# Patient Record
Sex: MALE | HISPANIC OR LATINO | URBAN - METROPOLITAN AREA
[De-identification: names, ages, dates, MRNs, and addresses within clinical notes are randomized per-mention and may not be internally consistent; named-entity substitution may affect disease eponyms.]

---

## 2021-08-24 ENCOUNTER — INPATIENT (INPATIENT)
Facility: HOSPITAL | Age: 41
LOS: 0 days | Discharge: ANOTHER HOSPITAL | End: 2021-08-25
Attending: STUDENT IN AN ORGANIZED HEALTH CARE EDUCATION/TRAINING PROGRAM | Admitting: STUDENT IN AN ORGANIZED HEALTH CARE EDUCATION/TRAINING PROGRAM
Payer: MEDICAID

## 2021-08-24 VITALS
DIASTOLIC BLOOD PRESSURE: 87 MMHG | RESPIRATION RATE: 16 BRPM | TEMPERATURE: 98 F | HEART RATE: 79 BPM | SYSTOLIC BLOOD PRESSURE: 133 MMHG | OXYGEN SATURATION: 100 %

## 2021-08-24 NOTE — ED ADULT TRIAGE NOTE - CHIEF COMPLAINT QUOTE
c/o headache and neck pain x 40 minutes. associated with nausea. worse with movement. states "this always happens because I have herniated discs." denies dizziness, weakness or other complaints.

## 2021-08-25 ENCOUNTER — INPATIENT (INPATIENT)
Facility: HOSPITAL | Age: 41
LOS: 6 days | Discharge: HOME | DRG: 93 | End: 2021-09-01
Attending: PSYCHIATRY & NEUROLOGY | Admitting: PSYCHIATRY & NEUROLOGY
Payer: MEDICAID

## 2021-08-25 ENCOUNTER — TRANSCRIPTION ENCOUNTER (OUTPATIENT)
Age: 41
End: 2021-08-25

## 2021-08-25 VITALS
HEART RATE: 73 BPM | TEMPERATURE: 98 F | SYSTOLIC BLOOD PRESSURE: 124 MMHG | OXYGEN SATURATION: 99 % | DIASTOLIC BLOOD PRESSURE: 89 MMHG | RESPIRATION RATE: 18 BRPM | WEIGHT: 149.47 LBS | HEIGHT: 67 IN

## 2021-08-25 VITALS
RESPIRATION RATE: 17 BRPM | SYSTOLIC BLOOD PRESSURE: 139 MMHG | TEMPERATURE: 98 F | HEART RATE: 82 BPM | DIASTOLIC BLOOD PRESSURE: 67 MMHG | OXYGEN SATURATION: 100 %

## 2021-08-25 DIAGNOSIS — Z98.890 OTHER SPECIFIED POSTPROCEDURAL STATES: Chronic | ICD-10-CM

## 2021-08-25 DIAGNOSIS — I63.9 CEREBRAL INFARCTION, UNSPECIFIED: ICD-10-CM

## 2021-08-25 DIAGNOSIS — G08 INTRACRANIAL AND INTRASPINAL PHLEBITIS AND THROMBOPHLEBITIS: ICD-10-CM

## 2021-08-25 DIAGNOSIS — Z29.9 ENCOUNTER FOR PROPHYLACTIC MEASURES, UNSPECIFIED: ICD-10-CM

## 2021-08-25 LAB
ALBUMIN SERPL ELPH-MCNC: 4.6 G/DL — SIGNIFICANT CHANGE UP (ref 3.3–5)
ALP SERPL-CCNC: 72 U/L — SIGNIFICANT CHANGE UP (ref 40–120)
ALT FLD-CCNC: 19 U/L — SIGNIFICANT CHANGE UP (ref 4–41)
ANION GAP SERPL CALC-SCNC: 15 MMOL/L — SIGNIFICANT CHANGE UP (ref 5–17)
ANION GAP SERPL CALC-SCNC: 16 MMOL/L — HIGH (ref 7–14)
APTT BLD: 124.6 SEC — CRITICAL HIGH (ref 27.5–35.5)
APTT BLD: 30.5 SEC — SIGNIFICANT CHANGE UP (ref 27–36.3)
APTT BLD: >200 SEC — CRITICAL HIGH (ref 27–36.3)
AST SERPL-CCNC: 21 U/L — SIGNIFICANT CHANGE UP (ref 4–40)
BASOPHILS # BLD AUTO: 0.11 K/UL — SIGNIFICANT CHANGE UP (ref 0–0.2)
BASOPHILS NFR BLD AUTO: 1.1 % — SIGNIFICANT CHANGE UP (ref 0–2)
BILIRUB SERPL-MCNC: 0.3 MG/DL — SIGNIFICANT CHANGE UP (ref 0.2–1.2)
BUN SERPL-MCNC: 13 MG/DL — SIGNIFICANT CHANGE UP (ref 7–23)
BUN SERPL-MCNC: 7 MG/DL — SIGNIFICANT CHANGE UP (ref 7–23)
CALCIUM SERPL-MCNC: 9.7 MG/DL — SIGNIFICANT CHANGE UP (ref 8.4–10.5)
CALCIUM SERPL-MCNC: 9.9 MG/DL — SIGNIFICANT CHANGE UP (ref 8.4–10.5)
CHLORIDE SERPL-SCNC: 101 MMOL/L — SIGNIFICANT CHANGE UP (ref 98–107)
CHLORIDE SERPL-SCNC: 103 MMOL/L — SIGNIFICANT CHANGE UP (ref 96–108)
CO2 SERPL-SCNC: 21 MMOL/L — LOW (ref 22–31)
CO2 SERPL-SCNC: 23 MMOL/L — SIGNIFICANT CHANGE UP (ref 22–31)
CREAT SERPL-MCNC: 0.67 MG/DL — SIGNIFICANT CHANGE UP (ref 0.5–1.3)
CREAT SERPL-MCNC: 0.95 MG/DL — SIGNIFICANT CHANGE UP (ref 0.5–1.3)
EOSINOPHIL # BLD AUTO: 0.06 K/UL — SIGNIFICANT CHANGE UP (ref 0–0.5)
EOSINOPHIL NFR BLD AUTO: 0.6 % — SIGNIFICANT CHANGE UP (ref 0–6)
GLUCOSE SERPL-MCNC: 129 MG/DL — HIGH (ref 70–99)
GLUCOSE SERPL-MCNC: 97 MG/DL — SIGNIFICANT CHANGE UP (ref 70–99)
HCT VFR BLD CALC: 42.6 % — SIGNIFICANT CHANGE UP (ref 39–50)
HCT VFR BLD CALC: 42.7 % — SIGNIFICANT CHANGE UP (ref 39–50)
HCT VFR BLD CALC: 44.5 % — SIGNIFICANT CHANGE UP (ref 39–50)
HGB BLD-MCNC: 13.7 G/DL — SIGNIFICANT CHANGE UP (ref 13–17)
HGB BLD-MCNC: 14 G/DL — SIGNIFICANT CHANGE UP (ref 13–17)
HGB BLD-MCNC: 14.3 G/DL — SIGNIFICANT CHANGE UP (ref 13–17)
IANC: 7.19 K/UL — SIGNIFICANT CHANGE UP (ref 1.5–8.5)
IMM GRANULOCYTES NFR BLD AUTO: 0.2 % — SIGNIFICANT CHANGE UP (ref 0–1.5)
INR BLD: 1.12 RATIO — SIGNIFICANT CHANGE UP (ref 0.88–1.16)
LYMPHOCYTES # BLD AUTO: 1.94 K/UL — SIGNIFICANT CHANGE UP (ref 1–3.3)
LYMPHOCYTES # BLD AUTO: 19.7 % — SIGNIFICANT CHANGE UP (ref 13–44)
MCHC RBC-ENTMCNC: 28.9 PG — SIGNIFICANT CHANGE UP (ref 27–34)
MCHC RBC-ENTMCNC: 32.1 GM/DL — SIGNIFICANT CHANGE UP (ref 32–36)
MCHC RBC-ENTMCNC: 32.2 GM/DL — SIGNIFICANT CHANGE UP (ref 32–36)
MCHC RBC-ENTMCNC: 32.8 GM/DL — SIGNIFICANT CHANGE UP (ref 32–36)
MCV RBC AUTO: 88 FL — SIGNIFICANT CHANGE UP (ref 80–100)
MCV RBC AUTO: 89.9 FL — SIGNIFICANT CHANGE UP (ref 80–100)
MCV RBC AUTO: 89.9 FL — SIGNIFICANT CHANGE UP (ref 80–100)
MONOCYTES # BLD AUTO: 0.52 K/UL — SIGNIFICANT CHANGE UP (ref 0–0.9)
MONOCYTES NFR BLD AUTO: 5.3 % — SIGNIFICANT CHANGE UP (ref 2–14)
NEUTROPHILS # BLD AUTO: 7.19 K/UL — SIGNIFICANT CHANGE UP (ref 1.8–7.4)
NEUTROPHILS NFR BLD AUTO: 73.1 % — SIGNIFICANT CHANGE UP (ref 43–77)
NRBC # BLD: 0 /100 WBCS — SIGNIFICANT CHANGE UP
NRBC # BLD: 0 /100 WBCS — SIGNIFICANT CHANGE UP
NRBC # BLD: 0 /100 WBCS — SIGNIFICANT CHANGE UP (ref 0–0)
NRBC # FLD: 0 K/UL — SIGNIFICANT CHANGE UP
NRBC # FLD: 0 K/UL — SIGNIFICANT CHANGE UP
PLATELET # BLD AUTO: 239 K/UL — SIGNIFICANT CHANGE UP (ref 150–400)
PLATELET # BLD AUTO: 241 K/UL — SIGNIFICANT CHANGE UP (ref 150–400)
PLATELET # BLD AUTO: 268 K/UL — SIGNIFICANT CHANGE UP (ref 150–400)
POTASSIUM SERPL-MCNC: 3.8 MMOL/L — SIGNIFICANT CHANGE UP (ref 3.5–5.3)
POTASSIUM SERPL-MCNC: 4.7 MMOL/L — SIGNIFICANT CHANGE UP (ref 3.5–5.3)
POTASSIUM SERPL-SCNC: 3.8 MMOL/L — SIGNIFICANT CHANGE UP (ref 3.5–5.3)
POTASSIUM SERPL-SCNC: 4.7 MMOL/L — SIGNIFICANT CHANGE UP (ref 3.5–5.3)
PROT SERPL-MCNC: 7.5 G/DL — SIGNIFICANT CHANGE UP (ref 6–8.3)
PROTHROM AB SERPL-ACNC: 12.8 SEC — SIGNIFICANT CHANGE UP (ref 10.6–13.6)
RBC # BLD: 4.74 M/UL — SIGNIFICANT CHANGE UP (ref 4.2–5.8)
RBC # BLD: 4.85 M/UL — SIGNIFICANT CHANGE UP (ref 4.2–5.8)
RBC # BLD: 4.95 M/UL — SIGNIFICANT CHANGE UP (ref 4.2–5.8)
RBC # FLD: 11.9 % — SIGNIFICANT CHANGE UP (ref 10.3–14.5)
RBC # FLD: 12 % — SIGNIFICANT CHANGE UP (ref 10.3–14.5)
RBC # FLD: 12.1 % — SIGNIFICANT CHANGE UP (ref 10.3–14.5)
SARS-COV-2 RNA SPEC QL NAA+PROBE: SIGNIFICANT CHANGE UP
SODIUM SERPL-SCNC: 139 MMOL/L — SIGNIFICANT CHANGE UP (ref 135–145)
SODIUM SERPL-SCNC: 140 MMOL/L — SIGNIFICANT CHANGE UP (ref 135–145)
WBC # BLD: 6.71 K/UL — SIGNIFICANT CHANGE UP (ref 3.8–10.5)
WBC # BLD: 7.44 K/UL — SIGNIFICANT CHANGE UP (ref 3.8–10.5)
WBC # BLD: 9.84 K/UL — SIGNIFICANT CHANGE UP (ref 3.8–10.5)
WBC # FLD AUTO: 6.71 K/UL — SIGNIFICANT CHANGE UP (ref 3.8–10.5)
WBC # FLD AUTO: 7.44 K/UL — SIGNIFICANT CHANGE UP (ref 3.8–10.5)
WBC # FLD AUTO: 9.84 K/UL — SIGNIFICANT CHANGE UP (ref 3.8–10.5)

## 2021-08-25 PROCEDURE — 99221 1ST HOSP IP/OBS SF/LOW 40: CPT

## 2021-08-25 PROCEDURE — 99223 1ST HOSP IP/OBS HIGH 75: CPT

## 2021-08-25 PROCEDURE — 93306 TTE W/DOPPLER COMPLETE: CPT | Mod: 26

## 2021-08-25 PROCEDURE — 70498 CT ANGIOGRAPHY NECK: CPT | Mod: 26

## 2021-08-25 PROCEDURE — 70496 CT ANGIOGRAPHY HEAD: CPT | Mod: 26

## 2021-08-25 RX ORDER — ONDANSETRON 8 MG/1
4 TABLET, FILM COATED ORAL EVERY 8 HOURS
Refills: 0 | Status: DISCONTINUED | OUTPATIENT
Start: 2021-08-25 | End: 2021-08-25

## 2021-08-25 RX ORDER — LANOLIN ALCOHOL/MO/W.PET/CERES
3 CREAM (GRAM) TOPICAL AT BEDTIME
Refills: 0 | Status: DISCONTINUED | OUTPATIENT
Start: 2021-08-25 | End: 2021-08-25

## 2021-08-25 RX ORDER — HEPARIN SODIUM 5000 [USP'U]/ML
5500 INJECTION INTRAVENOUS; SUBCUTANEOUS ONCE
Refills: 0 | Status: COMPLETED | OUTPATIENT
Start: 2021-08-25 | End: 2021-08-25

## 2021-08-25 RX ORDER — ACETAMINOPHEN 500 MG
1000 TABLET ORAL ONCE
Refills: 0 | Status: COMPLETED | OUTPATIENT
Start: 2021-08-25 | End: 2021-08-25

## 2021-08-25 RX ORDER — HEPARIN SODIUM 5000 [USP'U]/ML
2500 INJECTION INTRAVENOUS; SUBCUTANEOUS EVERY 6 HOURS
Refills: 0 | Status: DISCONTINUED | OUTPATIENT
Start: 2021-08-25 | End: 2021-08-25

## 2021-08-25 RX ORDER — HEPARIN SODIUM 5000 [USP'U]/ML
1000 INJECTION INTRAVENOUS; SUBCUTANEOUS
Qty: 25000 | Refills: 0 | Status: DISCONTINUED | OUTPATIENT
Start: 2021-08-25 | End: 2021-08-26

## 2021-08-25 RX ORDER — HEPARIN SODIUM 5000 [USP'U]/ML
12 INJECTION INTRAVENOUS; SUBCUTANEOUS
Qty: 25000 | Refills: 0 | Status: DISCONTINUED | OUTPATIENT
Start: 2021-08-25 | End: 2021-08-25

## 2021-08-25 RX ORDER — SODIUM CHLORIDE 9 MG/ML
1000 INJECTION INTRAMUSCULAR; INTRAVENOUS; SUBCUTANEOUS
Refills: 0 | Status: DISCONTINUED | OUTPATIENT
Start: 2021-08-25 | End: 2021-09-01

## 2021-08-25 RX ORDER — ACETAMINOPHEN 500 MG
2 TABLET ORAL
Qty: 0 | Refills: 0 | DISCHARGE
Start: 2021-08-25

## 2021-08-25 RX ORDER — HEPARIN SODIUM 5000 [USP'U]/ML
INJECTION INTRAVENOUS; SUBCUTANEOUS
Qty: 25000 | Refills: 0 | Status: DISCONTINUED | OUTPATIENT
Start: 2021-08-25 | End: 2021-08-25

## 2021-08-25 RX ORDER — METOCLOPRAMIDE HCL 10 MG
10 TABLET ORAL ONCE
Refills: 0 | Status: COMPLETED | OUTPATIENT
Start: 2021-08-25 | End: 2021-08-25

## 2021-08-25 RX ORDER — ACETAMINOPHEN 500 MG
650 TABLET ORAL EVERY 6 HOURS
Refills: 0 | Status: DISCONTINUED | OUTPATIENT
Start: 2021-08-25 | End: 2021-08-25

## 2021-08-25 RX ORDER — HEPARIN SODIUM 5000 [USP'U]/ML
10 INJECTION INTRAVENOUS; SUBCUTANEOUS
Qty: 25000 | Refills: 0 | Status: DISCONTINUED | OUTPATIENT
Start: 2021-08-25 | End: 2021-08-25

## 2021-08-25 RX ORDER — HEPARIN SODIUM 5000 [USP'U]/ML
5500 INJECTION INTRAVENOUS; SUBCUTANEOUS EVERY 6 HOURS
Refills: 0 | Status: DISCONTINUED | OUTPATIENT
Start: 2021-08-25 | End: 2021-08-25

## 2021-08-25 RX ORDER — HEPARIN SODIUM 5000 [USP'U]/ML
0 INJECTION INTRAVENOUS; SUBCUTANEOUS
Qty: 0 | Refills: 0 | DISCHARGE
Start: 2021-08-25

## 2021-08-25 RX ADMIN — HEPARIN SODIUM 1200 UNIT(S)/HR: 5000 INJECTION INTRAVENOUS; SUBCUTANEOUS at 05:41

## 2021-08-25 RX ADMIN — HEPARIN SODIUM 0 UNIT(S)/HR: 5000 INJECTION INTRAVENOUS; SUBCUTANEOUS at 14:17

## 2021-08-25 RX ADMIN — Medication 400 MILLIGRAM(S): at 23:00

## 2021-08-25 RX ADMIN — SODIUM CHLORIDE 100 MILLILITER(S): 9 INJECTION INTRAMUSCULAR; INTRAVENOUS; SUBCUTANEOUS at 20:01

## 2021-08-25 RX ADMIN — Medication 400 MILLIGRAM(S): at 02:11

## 2021-08-25 RX ADMIN — Medication 650 MILLIGRAM(S): at 09:41

## 2021-08-25 RX ADMIN — HEPARIN SODIUM 9 UNIT(S)/HR: 5000 INJECTION INTRAVENOUS; SUBCUTANEOUS at 20:01

## 2021-08-25 RX ADMIN — Medication 650 MILLIGRAM(S): at 16:03

## 2021-08-25 RX ADMIN — Medication 10 MILLIGRAM(S): at 02:12

## 2021-08-25 RX ADMIN — Medication 400 MILLIGRAM(S): at 18:21

## 2021-08-25 RX ADMIN — Medication 1000 MILLIGRAM(S): at 18:50

## 2021-08-25 RX ADMIN — HEPARIN SODIUM 10 UNIT(S)/HR: 5000 INJECTION INTRAVENOUS; SUBCUTANEOUS at 18:00

## 2021-08-25 RX ADMIN — HEPARIN SODIUM 5500 UNIT(S): 5000 INJECTION INTRAVENOUS; SUBCUTANEOUS at 05:39

## 2021-08-25 RX ADMIN — HEPARIN SODIUM 1000 UNIT(S)/HR: 5000 INJECTION INTRAVENOUS; SUBCUTANEOUS at 14:20
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
## 2021-08-25 NOTE — CONSULT NOTE ADULT - ASSESSMENT
Assessment and Recommendations:  41y male w/ pmhx of cervical herniated discs consulted for rule out papilledema, found to have multiple venous sinus thromboses on imaging. VA 20/30, no APD, IOP , CVF full, color plates full, EOMI, Anterior exam wnl and DFE reveals    #venous sinus thromboses  - multiple venous sinus thromboses found in superior sagittal sinus, dural venous sinus thrombosis, torcula, bilateral transverse and right sigmoid sinuses and right internal jugular vein  - COVID negative  - anticoagulation as per primary team  - appreciate neurology recs  - recommend MRI with gadolinium brain and orbits with and without contrast, and MRV brain and orbits    Outpatient follow-up: Patient should follow-up with his/her ophthalmologist or with [facility] Department of Ophthalmology within 1 week of after discharge at:    [address]  [place], [place] [id number] [phone]    Harry Chappell MD, PGY-2  Also available on [employer] Teams   Assessment and Recommendations:  41y male w/ pmhx of cervical herniated discs consulted for rule out papilledema, found to have multiple venous sinus thromboses on imaging. VA 20/30, no APD, IOP , CVF full, color plates full, EOMI, Anterior exam wnl and DFE reveals optic nerves 0.4 OU sharp and pink with clear disc margins and no obscuration of vessels, no disc elevation or edema.    #venous sinus thromboses  - multiple venous sinus thromboses found in superior sagittal sinus, dural venous sinus thrombosis, torcula, bilateral transverse and right sigmoid sinuses and right internal jugular vein  - COVID negative  - anticoagulation as per primary team  - appreciate neurology recs  - recommend MRI with gadolinium brain and orbits with and without contrast, and MRV brain and orbits  - Lack of papilledema does not rule out increased intracranial pressure. Recommend imaging, followed by lumbar puncture/neurology management if findings suggestive of increased intracranial pressure.     d/w neuro-ophthalmology attending Dr. Lin    Outpatient follow-up: Patient should follow-up with his/her ophthalmologist or with [facility] Department of Ophthalmology within 1 week of after discharge at:    [address]  [place] [address]  [phone]    Harry Chappell MD, PGY-2  Also available on [employer] Teams   Assessment and Recommendations:  41y male w/ pmhx of cervical herniated discs consulted for rule out papilledema, found to have multiple venous sinus thromboses on imaging. VA 20/30, no APD, IOP , CVF full, color plates full, EOMI, Anterior exam wnl and DFE reveals optic nerves 0.4 OU sharp and pink with clear disc margins and no obscuration of vessels, no disc elevation or edema.    #venous sinus thromboses  - multiple venous sinus thromboses found in superior sagittal sinus, dural venous sinus thrombosis, torcula, bilateral transverse and right sigmoid sinuses and right internal jugular vein  - COVID negative  - anticoagulation as per primary team  - appreciate neurology recs  - coagulopathy workup as per primary team  - recommend MRI with gadolinium brain and orbits with and without contrast, and MRV brain and orbits  - Lack of papilledema does not rule out increased intracranial pressure. Recommend imaging, followed by lumbar puncture/neurology management if findings suggestive of increased intracranial pressure.     d/w neuro-ophthalmology attending Dr. Lin    Outpatient follow-up: Patient should follow-up with his/her ophthalmologist or with [facility] Department of Ophthalmology within 1 week of after discharge at:    [address]  [place] [address]  [phone]    Harry Chappell MD, PGY-2  Also available on [employer] Teams

## 2021-08-25 NOTE — ED PROVIDER NOTE - OBJECTIVE STATEMENT
42 yo M with no pmh presents with sudden onset headache at 1am described as worse headache of his life, sudden in onset, now vomiting. Reports sitting on his couch when he suddenly developed a 10/10 diffuse headache. No radiating with focal weakness. Patient vomiting in the room. Denies trauma or falls. No fevers or chills. No fam hx of aneurisms. 42 yo M with no pmh presents with sudden onset headache at 1am described as worse headache of his life, sudden in onset, now vomiting. Reports sitting on his couch when he suddenly developed a 10/10 diffuse headache. Non radiating and no focal weakness. Patient vomiting in the room. Denies trauma or falls. No fevers or chills. No fam hx of aneurisms.

## 2021-08-25 NOTE — ED ADULT NURSE REASSESSMENT NOTE - NS ED NURSE REASSESS COMMENT FT1
EMS here for transport to [facility]. Pt a&ox4, breathing even and unlabored, vs as noted. Pt c/o HA, Medicated as per PRN order. No neuro deficits observed or reported.

## 2021-08-25 NOTE — CONSULT NOTE ADULT - ASSESSMENT
PAULO MARTI  41M no sig PMHx, xfer [facility] for stroke tx of extensive CVST, pw sudden onset WHOL AM 8/25 a/w blurry vision and 1x vomiting. Continues to have severe frontal and retroorbital HA, and R-sided neck pain. CTH non con w/ hyperdense SSS and b/l TS, no ICH. CTA/V w/ diffuse VST involving posterior 2/3 SSS, b/l TS, R sigmoid sinus into IJ, patent deep venous system. Exam: AOx3, pupils dilated by ophtho, VFF (subj blurry vision), EOMI, no facial, RAMIREZ 5/5, no drift, SILT.   - ADM Stroke, q2h neuro checks  - hep gtt, goal 60-90 per neuro, last PTT supratx 124  - MRI/MRV +C pending, CTH for any acute change in exam  - preop for possible angio in AM, keep NPO at midnight, consented  - needs T&Sx2 (ordered)  - please document medical clearance for angio tonight  - keep on fluids/hydrated  - AEDs per neurology  will dw Dr. Mustafa PAULO MARTI  41M no sig PMHx, xfer [facility] for stroke tx of extensive CVST, pw sudden onset WHOL AM 8/25 a/w blurry vision and 1x vomiting. Continues to have severe frontal and retroorbital HA, and R-sided neck pain. CTH non con w/ hyperdense SSS and b/l TS, no ICH. CTA/V w/ diffuse VST involving posterior 2/3 SSS, b/l TS, R sigmoid sinus into IJ, patent deep venous system. Exam: AOx3, pupils dilated by ophtho, VFF (subj blurry vision), EOMI, no facial, RAMIREZ 5/5, no drift, SILT.   - ADM Stroke, q2h neuro checks  - hep gtt, goal 60-90 per neuro, last PTT supratx 124  - MRI/MRV +C pending, CTH for any acute change in exam  - preop for possible angio in AM, keep NPO at midnight, consented  - needs T&Sx2 (ordered)  - please document medical clearance for angio tonight  - keep on fluids/hydrated  - AEDs per neurology  - ophtho saw, no papilledema  will dw Dr. Mustafa

## 2021-08-25 NOTE — H&P ADULT - ATTENDING COMMENTS
now in stroke unit.   40 yo with CVST of unclear etiology.   angio deferred.   Dr. Villalobos outpt.   heparin drip now. transition to pradaxa for 3-6 month  hypercoag workup will be altered 2/2 heparin. needs to be done outpt   ct c a p  PT/OT  d/c plan this week.   o/e normal.

## 2021-08-25 NOTE — ED PROVIDER NOTE - NS ED ROS FT
GENERAL: No fever or chills  EYES: no change in vision  HEENT: no trouble swallowing or speaking  CARDIAC: no chest pain or palpiations   PULMONARY: no cough or SOB  GI: no abdominal pain,  diarrhea, or constipation   : No changes in urination  SKIN: no rashes  NEURO: see hpi  MSK: No joint pain

## 2021-08-25 NOTE — H&P ADULT - PROBLEM SELECTOR PLAN 1
Pt presenting with HA 2/2 extensive central venous thrombosis   -C/w Heparin gtt  -Will hold off on hypercoag panel as pt already on Heparin gtt.   -CT chest/abdomen/pelvis with contrast at [facility]  -Plan for transfer for [facility] stroke unit today, pt agreeable.  -Seen by neuro, appreciate recs

## 2021-08-25 NOTE — CONSULT NOTE ADULT - SUBJECTIVE AND OBJECTIVE BOX
[facility] DEPARTMENT OF OPHTHALMOLOGY - INITIAL ADULT CONSULT  -----------------------------------------------------------------------------  Harry Chappell MD PGY-2  -----------------------------------------------------------------------------    HPI: 40 YO M no PMH    Interval History: ***    PMH: ***  POcHx: denies surg/laser  FH: denies glc/amd  Social History: denies etoh/tobacco  Ophthalmic Medications: none  Allergies: NKDA    Review of Systems:  Constitutional: No fever, chills  Eyes: +blurry vision OU. No flashes, floaters, FBS, erythema, discharge, double vision, OU  Neuro: No tremors  Cardiovascular: No chest pain, palpitations  Respiratory: No SOB, no cough  GI: No nausea, vomiting, abdominal pain  : No dysuria  Skin: no rash  Psych: no depression  Endocrine: no polyuria, polydipsia  Heme/lymph: no swelling    VITALS: T(C): 36.4 (08-25-21 @ 16:40)  T(F): 97.5 (08-25-21 @ 16:40), Max: 98.2 (08-24-21 @ 23:58)  HR: 80 (08-25-21 @ 18:00) (64 - 82)  BP: 133/90 (08-25-21 @ 18:00) (114/80 - 139/67)  RR:  (16 - 19)  SpO2:  (98% - 100%)  Wt(kg): --  General: AAO x 3, appropriate mood and affect    Ophthalmology Exam:  Visual acuity (sc): 20/20 OU  Pupils: PERRL OU, no APD  Ttono: 16 OU  Extraocular movements (EOMs): Full OU, no pain, no diplopia  Confrontational Visual Field (CVF): Full OU  Color Plates: 12/12 OU    Pen Light Exam (PLE)  External: Flat OU  Lids/Lashes/Lacrimal Ducts: Flat OU    Sclera/Conjunctiva: W+Q OU  Cornea: Cl OU  Anterior Chamber: D+F OU    Iris: Flat OU  Lens: Cl OU    Fundus Exam: dilated with 1% tropicamide and 2.5% phenylephrine  Approval obtained from primary team for dilation  Patient aware that pupils can remained dilated for at least 4-6 hours  Exam performed with 20D lens    Vitreous: wnl OU  Disc, cup/disc: sharp and pink, 0.4 OU  Macula: wnl OU  Vessels: wnl OU  Periphery: wnl OU    Labs/Imaging:  *** [facility] DEPARTMENT OF OPHTHALMOLOGY - INITIAL ADULT CONSULT  -----------------------------------------------------------------------------  Harry Chappell MD PGY-2  -----------------------------------------------------------------------------    HPI: 40 YO M PMH cervical herniated discs found to have multiple venous sinus thromboses and ophthalmology consulted for rule out papilledema. Pt states that he had a headache early this morning along with blurry vision OU. Pt denies family history of coagulopathy or malignancy. Pt endorsed nausea that has since resolved.  Pt denies vision loss, flashes, floaters, curtains, decreased vision, double vision, ocular trauma.       PMH: cervical herniated discs   POcHx: denies surg/laser  FH: denies glc/amd  Social History: denies etoh/tobacco  Ophthalmic Medications: none  Allergies: NKDA    Review of Systems:  Constitutional: No fever, chills  Eyes: +blurry vision OU. No flashes, floaters, FBS, erythema, discharge, double vision, OU  Neuro: No tremors  Cardiovascular: No chest pain, palpitations  Respiratory: No SOB, no cough  GI: +nausea. No vomiting, abdominal pain  : No dysuria  Skin: no rash  Psych: no depression  Endocrine: no polyuria, polydipsia  Heme/lymph: no swelling    VITALS: T(C): 36.4 (08-25-21 @ 16:40)  T(F): 97.5 (08-25-21 @ 16:40), Max: 98.2 (08-24-21 @ 23:58)  HR: 80 (08-25-21 @ 18:00) (64 - 82)  BP: 133/90 (08-25-21 @ 18:00) (114/80 - 139/67)  RR:  (16 - 19)  SpO2:  (98% - 100%)  Wt(kg): --  General: AAO x 3, appropriate mood and affect    Ophthalmology Exam:  Visual acuity (sc): 20/30 OU  Pupils: PERRL OU, no APD  Ttono: 16 OU  Extraocular movements (EOMs): Full OU, no pain, no diplopia  Confrontational Visual Field (CVF): Full OU  Color Plates: 12/12 OU    Pen Light Exam (PLE)  External: Flat OU  Lids/Lashes/Lacrimal Ducts: Flat OU    Sclera/Conjunctiva: W+Q OU  Cornea: Cl OU  Anterior Chamber: D+F OU    Iris: Flat OU  Lens: Cl OU    Fundus Exam: dilated with 1% tropicamide and 2.5% phenylephrine  Approval obtained from primary team for dilation  Patient aware that pupils can remained dilated for at least 4-6 hours  Exam performed with 20D lens    Vitreous: wnl OU  Disc, cup/disc: sharp and pink, 0.4 OU  Macula: wnl OU  Vessels: wnl OU  Periphery: wnl OU    Labs/Imaging:    IMPRESSION:    CT HEAD: Hyperdense superior sagittal sinus due to acute venous sinus thrombosis. No acute intracranial bleeding.    CTA BRAIN: Diffuse dural venous sinus thrombosis with clot involving the mid and posterior superior sagittal sinus, torcula, bilateral transverse and right sigmoid sinuses, and right internal jugular vein. Patent deep venous system.    CTA NECK: Patent cervical vasculature. No flow limiting stenosis or occlusion.    Findings were discussed with Dr. Jorge Flores at 4:40 AM on 8/25/2021 who indicated that the communication is understood.    --- End of Report ---            ILIANA ROJAS MD; Resident Radiology  This document has been electronically signed.  TERRANCE BOSS MD; Attending Radiologist  This document has been electronically signed. Aug 25 2021 4:45AM   [facility] DEPARTMENT OF OPHTHALMOLOGY - INITIAL ADULT CONSULT  -----------------------------------------------------------------------------  Harry Chappell MD PGY-2  -----------------------------------------------------------------------------    HPI: 42 YO M PMH cervical herniated discs found to have multiple venous sinus thromboses and ophthalmology consulted for rule out papilledema. Pt states that he had a headache early this morning along with blurry vision OU. Pt denies family history of coagulopathy or malignancy. Pt endorsed nausea that has since resolved.  Pt denies vision loss, flashes, floaters, curtains, decreased vision, double vision, ocular trauma.       PMH: cervical herniated discs   POcHx: denies surg/laser  FH: denies glc/amd  Social History: denies etoh/tobacco  Ophthalmic Medications: none  Allergies: NKDA    Review of Systems:  Constitutional: No fever, chills  Eyes: +blurry vision OU. No flashes, floaters, FBS, erythema, discharge, double vision, OU  Neuro: No tremors  Cardiovascular: No chest pain, palpitations  Respiratory: No SOB, no cough  GI: +nausea. No vomiting, abdominal pain  : No dysuria  Skin: no rash  Psych: no depression  Endocrine: no polyuria, polydipsia  Heme/lymph: no swelling    VITALS: T(C): 36.4 (08-25-21 @ 16:40)  T(F): 97.5 (08-25-21 @ 16:40), Max: 98.2 (08-24-21 @ 23:58)  HR: 80 (08-25-21 @ 18:00) (64 - 82)  BP: 133/90 (08-25-21 @ 18:00) (114/80 - 139/67)  RR:  (16 - 19)  SpO2:  (98% - 100%)  Wt(kg): --  General: AAO x 3, appropriate mood and affect    Ophthalmology Exam:  Visual acuity (sc): 20/30 OU  Pupils: PERRL OU, no APD  Ttono: 16 OU  Extraocular movements (EOMs): Full OU, no pain, no diplopia  Confrontational Visual Field (CVF): Full OU  Color Plates: 12/12 OU    Pen Light Exam (PLE)  External: Flat OU  Lids/Lashes/Lacrimal Ducts: Flat OU    Sclera/Conjunctiva: W+Q OU  Cornea: Cl OU  Anterior Chamber: D+F OU    Iris: Flat OU  Lens: Cl OU    Fundus Exam: dilated with 1% tropicamide and 2.5% phenylephrine  Approval obtained from primary team for dilation  Patient aware that pupils can remained dilated for at least 4-6 hours  Exam performed with 20D lens    Vitreous: wnl OU  Disc, cup/disc: sharp and pink with clear disc margins and no obscuration of vessels, no disc elevation or edema, 0.4 OU  Macula: wnl OU  Vessels: wnl OU  Periphery: wnl OU    Labs/Imaging:    IMPRESSION:    CT HEAD: Hyperdense superior sagittal sinus due to acute venous sinus thrombosis. No acute intracranial bleeding.    CTA BRAIN: Diffuse dural venous sinus thrombosis with clot involving the mid and posterior superior sagittal sinus, torcula, bilateral transverse and right sigmoid sinuses, and right internal jugular vein. Patent deep venous system.    CTA NECK: Patent cervical vasculature. No flow limiting stenosis or occlusion.    Findings were discussed with Dr. Jorge Flores at 4:40 AM on 8/25/2021 who indicated that the communication is understood.    --- End of Report ---            ILIANA ROJAS MD; Resident Radiology  This document has been electronically signed.  TERRANCE BOSS MD; Attending Radiologist  This document has been electronically signed. Aug 25 2021 4:45AM

## 2021-08-25 NOTE — ED PROVIDER NOTE - CLINICAL SUMMARY MEDICAL DECISION MAKING FREE TEXT BOX
40 yo M with no pmh presents with sudden onset headache at 1am described as worse headache of his life, sudden in onset, now vomiting. VS WNL. Vomiting. Neurologically intact. PURRL. Concerning for SAH vs intracranial mass. Will expedite head CT, reglan, tylenol, reassess.
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
## 2021-08-25 NOTE — DISCHARGE NOTE PROVIDER - NSDCCPCAREPLAN_GEN_ALL_CORE_FT
PRINCIPAL DISCHARGE DIAGNOSIS  Diagnosis: Dural venous sinus thrombosis  Assessment and Plan of Treatment: Pt has headache d/t extensive venous sinus thrombosis. Transferred to [facility] for surgical eval.

## 2021-08-25 NOTE — ED PROVIDER NOTE - ATTENDING CONTRIBUTION TO CARE
I performed a history and physical exam of the patient and discussed their management with the resident.  I reviewed the resident's note and agree with the documented findings and plan of care except as noted below. My medical decision making and observations are as follows:    40 yo M with no pmh presents with sudden onset headache at 1am described as worse headache of his life, sudden in onset, now vomiting. Reports sitting on his couch when he suddenly developed a 10/10 diffuse headache. Non radiating and no focal weakness.  Pt uncomfortable appearing actively vomiting, heart rrr, lungs cta, 5/5 strength and equal sensation through out.  Concern for SAH - plan for urgent CT/CTAs, labs, pain control and reassess.  - Katerin Adhikari, DO

## 2021-08-25 NOTE — H&P ADULT - NSHPLABSRESULTS_GEN_ALL_CORE
LABORATORY:  CBC                       14.0   6.71  )-----------( 239      ( 25 Aug 2021 18:41 )             42.7     Chem 08-25    140  |  101  |  13  ----------------------------<  129<H>  3.8   |  23  |  0.95    Ca    9.9      25 Aug 2021 02:29    TPro  7.5  /  Alb  4.6  /  TBili  0.3  /  DBili  x   /  AST  21  /  ALT  19  /  AlkPhos  72  08-25    LFTs LIVER FUNCTIONS - ( 25 Aug 2021 02:29 )  Alb: 4.6 g/dL / Pro: 7.5 g/dL / ALK PHOS: 72 U/L / ALT: 19 U/L / AST: 21 U/L / GGT: x           Coagulopathy PT/INR - ( 25 Aug 2021 05:27 )   PT: 12.8 sec;   INR: 1.12 ratio         PTT - ( 25 Aug 2021 12:01 )  PTT:>200.0 sec  Lipid Panel   A1c   Cardiac enzymes     U/A   CSF  Immunological  Other    STUDIES & IMAGING:  Studies (EKG, EEG, EMG, etc):     Radiology (XR, CT, MR, U/S, TTE/RAYA):  < from: CT Angio Head w/ IV Cont (08.25.21 @ 03:07) >  IMPRESSION:    CT HEAD: Hyperdense superior sagittal sinus due to acute venous sinus thrombosis. No acute intracranial bleeding.    CTA BRAIN: Diffuse dural venous sinus thrombosis with clot involving the mid and posterior superior sagittal sinus, torcula, bilateral transverse and right sigmoid sinuses, and right internal jugular vein. Patent deep venous system.    CTA NECK: Patent cervical vasculature. No flow limiting stenosis or occlusion.    < end of copied text >

## 2021-08-25 NOTE — H&P ADULT - NSHPPHYSICALEXAM_GEN_ALL_CORE
.  VITAL SIGNS:  T(C): 36.8 (08-25-21 @ 12:45), Max: 36.8 (08-24-21 @ 23:58)  T(F): 98.2 (08-25-21 @ 12:45), Max: 98.2 (08-24-21 @ 23:58)  HR: 76 (08-25-21 @ 12:45) (64 - 79)  BP: 114/80 (08-25-21 @ 12:45) (114/80 - 133/87)  BP(mean): --  RR: 18 (08-25-21 @ 12:45) (16 - 19)  SpO2: 99% (08-25-21 @ 12:45) (98% - 100%)  Wt(kg): --    PHYSICAL EXAM:    Constitutional: sitting in stretcher, NAD  Head: NC/AT, mild TTP b/l cervical neck   Eyes: PERRL, EOMI, clear conjunctiva  ENT: no nasal discharge; uvula midline, no oropharyngeal erythema or exudates; MMM  Neck: supple  Respiratory: CTA B/L; no W/R/R, no retractions  Cardiac: +S1/S2; RRR; no M/R/G  Gastrointestinal: soft, NT/ND; no rebound or guarding; +BSx4  Back: spine midline, no bony tenderness or step-offs; no CVAT B/L  Extremities: WWP, no clubbing or cyanosis; no peripheral edema  Musculoskeletal: NROM x4; no joint swelling, tenderness or erythema  Vascular: 2+ radial, DP pulses B/L  Dermatologic: skin warm, dry and intact; no rashes, wounds, or scars  Neurologic: AAOx3; CNII-XII grossly intact; no focal deficits  Psychiatric: affect and characteristics of appearance, verbalizations, behaviors are appropriate

## 2021-08-25 NOTE — H&P ADULT - NSHPPHYSICALEXAM_GEN_ALL_CORE
Vital Signs Last 24 Hrs  T(C): 36.4 (25 Aug 2021 16:40), Max: 36.8 (24 Aug 2021 23:58)  T(F): 97.5 (25 Aug 2021 16:40), Max: 98.2 (24 Aug 2021 23:58)  HR: 80 (25 Aug 2021 18:00) (64 - 82)  BP: 133/90 (25 Aug 2021 18:00) (114/80 - 139/67)  BP(mean): 104 (25 Aug 2021 18:00) (99 - 104)  RR: 17 (25 Aug 2021 18:00) (16 - 19)  SpO2: 99% (25 Aug 2021 18:00) (98% - 100%)    General:  Constitutional: Male, appears stated age, in no apparent distress including pain  Neurological (>12):  MS: Awake, alert, oriented to person, place, situation. Normal affect. Follows all commands.  Language: Speech is clear, fluent with comprehension  CNs: PERRL (R = 3mm, L = 3mm), no APD. CVF full. EOMI no nystagmus. V1-3 intact to LT b/l. No facial asymmetry b/l. Hearing grossly normal (rubbing fingers) b/l. Symmetric palate elevation in midline. Gag reflex deferred. Tongue midline, normal movements, no atrophy.  Fundoscopic:     Motor: Normal muscle bulk. No noticeable tremor. Right pronator drift.  No motor drift in the extremities.    Sensation: Intact to LT b/l throughout.   Cortical: Extinction on DSS (neglect): none  Reflexes: 2+ biceps, patellars. plantars mute  Coordination: No dysmetria to FTN b/l  Gait: deferred Vital Signs Last 24 Hrs  T(C): 36.4 (25 Aug 2021 16:40), Max: 36.8 (24 Aug 2021 23:58)  T(F): 97.5 (25 Aug 2021 16:40), Max: 98.2 (24 Aug 2021 23:58)  HR: 80 (25 Aug 2021 18:00) (64 - 82)  BP: 133/90 (25 Aug 2021 18:00) (114/80 - 139/67)  BP(mean): 104 (25 Aug 2021 18:00) (99 - 104)  RR: 17 (25 Aug 2021 18:00) (16 - 19)  SpO2: 99% (25 Aug 2021 18:00) (98% - 100%)    General:  Constitutional: Male, appears stated age, in no apparent distress including pain  Neurological (>12):  MS: Awake, alert, oriented to person, place, situation. Normal affect. Follows all commands.  Language: Speech is clear, fluent with comprehension  CNs: PERRL (R = 3mm, L = 3mm), no APD. CVF full. EOMI no nystagmus. V1-3 intact to LT b/l. No facial asymmetry b/l. Hearing grossly normal (rubbing fingers) b/l. Symmetric palate elevation in midline. Gag reflex deferred. Tongue midline, normal movements, no atrophy.  Fundoscopic: discs sharp and pink b/l    Motor: Normal muscle bulk. No noticeable tremor. Right pronator drift.  No motor drift in the extremities.    Sensation: Intact to LT b/l throughout.   Cortical: Extinction on DSS (neglect): none  Reflexes: 2+ biceps, patellars. plantars mute  Coordination: No dysmetria to FTN b/l  Gait: deferred

## 2021-08-25 NOTE — ED ADULT NURSE NOTE - NSIMPLEMENTINTERV_GEN_ALL_ED
Implemented All Universal Safety Interventions:  [place] to call system. Call bell, personal items and telephone within reach. Instruct patient to call for assistance. Room bathroom lighting operational. Non-slip footwear when patient is off stretcher. Physically safe environment: no spills, clutter or unnecessary equipment. Stretcher in lowest position, wheels locked, appropriate side rails in place.

## 2021-08-25 NOTE — CONSULT NOTE ADULT - ASSESSMENT
42 y/o right-handed Nauruan-speaking man (ID #[id number]) with PMHx cervical herniated discs presents with sudden onset headache at 1am on 8/25 described as worse headache of his life, sudden in onset while sitting on his couch watching TV when he suddenly developed a 10/10 diffuse headache and blurry vision. CTA head/neck showed diffuse dural venous sinus thrombosis with clot involving the mid and posterior superior sagittal sinus, torcula, bilateral transverse and right sigmoid sinuses, and right internal jugular vein.    Impression: Headache and vision changes, now improved, secondary to extensive venous sinus thrombosis of unknown etiology    Recommendations:  [] Continue heparin ggt w/ goal PTT 60-90  [] Hypercog panel -   [] Malignancy workup - CT chest/abdomen/pelvis with contrast  [] MRI brain w/o contrast  [] TTE w/ bubble study  [] Lower extremity dopplers to r/o DVT  [] Lipid panel, HbA1C  [] Repeat CTH STAT if neurological deterioration/change in mental status vs. endovascular therapy    Patient to be seen and discussed with Dr. Libman, neurovascular attending.    Carolina Thorne DO  PGY-3  Neurology Resident 42 y/o right-handed South African-speaking man (ID #[id number]) with PMHx cervical herniated discs presents with sudden onset headache at 1am on 8/25 described as worse headache of his life, sudden in onset while sitting on his couch watching TV when he suddenly developed a 10/10 diffuse headache and blurry vision. CTA head/neck showed diffuse dural venous sinus thrombosis with clot involving the mid and posterior superior sagittal sinus, torcula, bilateral transverse and right sigmoid sinuses, and right internal jugular vein.    Impression: Headache and vision changes, now improved, secondary to extensive venous sinus thrombosis of unknown etiology    Recommendations:  [] Continue heparin ggt w/ goal PTT 60-90  [] Hypercog panel - anticardiolipin ab, RVVT, antithrombin iii, factor 5 leiden, factor viii activity, homocysteine, protein C/S activity, prothrombin gene mutation, lupus anticoagulant panel, beta-2 glycoprotein 1 ab, cardiolipin antibodies, d-dimer  [] Malignancy workup - CT chest/abdomen/pelvis with contrast  [] MRI brain w/o contrast  [] TTE w/ bubble study  [] Lower extremity dopplers to r/o DVT  [] Lipid panel, HbA1C  [] Repeat CTH STAT if neurological deterioration/change in mental status vs. endovascular therapy    Patient to be seen and discussed with Dr. Libman, neurovascular attending.    Carolina Thorne DO  PGY-3  Neurology Resident 42 y/o right-handed Prydeinig-speaking man (ID #[id number]) with PMHx cervical herniated discs presents with sudden onset headache at 1am on 8/25 described as worse headache of his life, sudden in onset while sitting on his couch watching TV when he suddenly developed a 10/10 diffuse headache and blurry vision. CTA head/neck showed diffuse dural venous sinus thrombosis with clot involving the mid and posterior superior sagittal sinus, torcula, bilateral transverse and right sigmoid sinuses, and right internal jugular vein. Neuro exam with no focal deficits, though recurring right-sided neck pain and posterior headache.    Impression: Headache and vision changes, now improved, secondary to extensive central venous sinus thromboses of unknown etiology    Recommendations:  [] Continue heparin ggt w/ goal PTT 60-90  [] Hypercog panel - anticardiolipin ab, RVVT, antithrombin iii, factor 5 leiden, factor viii activity, homocysteine, protein C/S activity, prothrombin gene mutation, lupus anticoagulant panel, beta-2 glycoprotein 1 ab, cardiolipin antibodies, d-dimer  [] Malignancy workup - CT chest/abdomen/pelvis with contrast  [] MRI brain w/o contrast and MR venogram w/ contrast  [] May consider repeating vessel imaging at a future date to assess clot burden/improvement since initiation of AC  [] TTE w/ bubble study  [] Lower extremity dopplers to r/o DVT  [] Lipid panel, HbA1C  [] Repeat CTH STAT if neurological deterioration/change in mental status vs. endovascular therapy  [] PT/OT    Patient to be seen and discussed with Dr. Libman, neurovascular attending.    Carolina Thorne DO  PGY-3  Neurology Resident 40 y/o right-handed Ecuadorean-speaking man (ID #[id number]) with PMHx cervical herniated discs presents with sudden onset headache at 1am on 8/25 described as worse headache of his life, sudden in onset while sitting on his couch watching TV when he suddenly developed a 10/10 diffuse headache and blurry vision. CTA head/neck showed diffuse dural venous sinus thrombosis with clot involving the mid and posterior superior sagittal sinus, torcula, bilateral transverse and right sigmoid sinuses, and right internal jugular vein. Neuro exam with no focal deficits, though recurring right-sided neck pain and posterior headache.    Impression: Headache and vision changes, now improved, secondary to extensive central venous sinus thromboses of unknown etiology    Recommendations:  [] Continue heparin ggt w/ goal PTT 60-90  [] Hypercog panel - anticardiolipin ab, RVVT, antithrombin III, factor 5 Leiden, factor VIII activity, homocysteine, protein C/S activity, prothrombin gene mutation, lupus anticoagulant panel, beta-2 glycoprotein 1 ab, cardiolipin antibodies, d-dimer  [] CT chest/abdomen/pelvis with contrast for malignancy workup  [] MRI brain w/o contrast and MR venogram w/ contrast  [] May consider repeating vessel imaging at a future date to assess clot burden/improvement since initiation of AC  [] TTE w/ bubble study  [] Lower extremity dopplers to r/o DVT  [] Lipid panel, HbA1C  [] Repeat CTH STAT if neurological deterioration/change in mental status vs. endovascular therapy  [] PT/OT    Patient to be seen and discussed with Dr. Libman, neurovascular attending.    Carolina Thorne DO  PGY-3  Neurology Resident
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
## 2021-08-25 NOTE — ED ADULT NURSE NOTE - OBJECTIVE STATEMENT
Pt received in rm 22. C/o headache and neck pain that started around 1AM today while he was sitting down. Pain associated with nausea. Pt had one episode of vomiting while MD at bedside for eval. Hx of herniated disc. Primarily Occitan speaking. A&ox4, ambulatory at baseline. Breathing even and unlabored. NSR on the cardiac monitor. Other vitals stable. Endorses weakness. Pt appears uncomfortable. Denies chest pain, SOB, fever or chills. 18G IV placed on left AC. Labs drawn and sent. Medicated per MAR. Pt currently at CT. Will continue to monitor.
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
## 2021-08-25 NOTE — ED ADULT NURSE REASSESSMENT NOTE - NS ED NURSE REASSESS COMMENT FT1
Pt resting, breathing even and unlabored, vs as noted. Pt c/o HA 5/10. Will contact provider. Pt remains on monitor in hallway. Will reassess

## 2021-08-25 NOTE — ED PROVIDER NOTE - PROGRESS NOTE DETAILS
Patient reassessed. Headache improved 4/10. No longer nauseous. Repeat neurological exam WNL. Pending CT read. Neurology paged multiple times. Awaiting call back. Ct with diffuse dural venous sinus thrombosis.  Neurology paged. Heparin ordered. Neurology aware of the patient.

## 2021-08-25 NOTE — ED PROVIDER NOTE - PHYSICAL EXAMINATION
Gen: AAOx3, vomiting, in acute distress.   Head: NCAT  HEENT: EOMI, oral mucosa moist, normal conjunctiva  Lung: CTAB, no respiratory distress, no wheezes/rhonchi/rales B/L, speaking in full sentences  CV: RRR, no murmurs, rubs or gallops  Abd: soft, NTND, no guarding, no CVA tenderness  MSK: no visible deformities  Neuro: No focal sensory or motor deficits, normal CN exam   Skin: Warm, well perfused, no rash  Psych: normal affect.

## 2021-08-25 NOTE — CONSULT NOTE ADULT - ATTENDING COMMENTS
Extensive venous sinus thrombosis, patient minimally symptomatic with headaches, no ICH and no papilledema. Treating with heparin drip, will hold off on intervention.

## 2021-08-25 NOTE — H&P ADULT - NSHPLABSRESULTS_GEN_ALL_CORE
.  LABS:                         13.7   7.44  )-----------( 241      ( 25 Aug 2021 12:01 )             42.6     08-25    140  |  101  |  13  ----------------------------<  129<H>  3.8   |  23  |  0.95    Ca    9.9      25 Aug 2021 02:29    TPro  7.5  /  Alb  4.6  /  TBili  0.3  /  DBili  x   /  AST  21  /  ALT  19  /  AlkPhos  72  08-25    PT/INR - ( 25 Aug 2021 05:27 )   PT: 12.8 sec;   INR: 1.12 ratio         PTT - ( 25 Aug 2021 12:01 )  PTT:>200.0 sec              RADIOLOGY, EKG & ADDITIONAL TESTS: Reviewed.   EKG: Pending     ct< from: CT Angio Head w/ IV Cont (08.25.21 @ 03:07) >  IMPRESSION:  CT HEAD: Hyperdense superior sagittal sinus due to acute venous sinus thrombosis. No acute intracranial bleeding.  CTA BRAIN: Diffuse dural venous sinus thrombosis with clot involving the mid and posterior superior sagittal sinus, torcula, bilateral transverse and right sigmoid sinuses, and right internal jugular vein. Patent deep venous system.  CTA NECK: Patent cervical vasculature. No flow limiting stenosis or occlusion.  Findings were discussed with Dr. Jorge Flores at 4:40AM on 8/25/2021 who indicated that the communication is understood.  --- End of Report ---    ILIANA ROJAS MD; Resident Radiology  This document has been electronically signed.  TERRANCE BOSS MD; Attending Radiologist  This document has been electronically signed. Aug 25 2021  4:45AM

## 2021-08-25 NOTE — DISCHARGE NOTE PROVIDER - NSDCMRMEDTOKEN_GEN_ALL_CORE_FT
heparin 100 units/mL-D5% intravenous solution:  intravenous   Tylenol 325 mg oral tablet: 2 tab(s) orally every 6 hours, As needed, Mild Pain (1 - 3)
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
## 2021-08-25 NOTE — H&P ADULT - ASSESSMENT
41M w/cervical herniated discs p/w sudden onset headache found to have extensive central venous sinus thromboses of unknown etiology, started on Heparin gtt. Seen by neuro, pt will be transferred to [facility] for evaluation w/Dr. Mendes.

## 2021-08-26 DIAGNOSIS — R51.9 HEADACHE, UNSPECIFIED: ICD-10-CM

## 2021-08-26 DIAGNOSIS — G08 INTRACRANIAL AND INTRASPINAL PHLEBITIS AND THROMBOPHLEBITIS: ICD-10-CM

## 2021-08-26 PROBLEM — M50.20 OTHER CERVICAL DISC DISPLACEMENT, UNSPECIFIED CERVICAL REGION: Chronic | Status: ACTIVE | Noted: 2021-08-25

## 2021-08-26 LAB
A1C WITH ESTIMATED AVERAGE GLUCOSE RESULT: 5.6 % — SIGNIFICANT CHANGE UP (ref 4–5.6)
ALBUMIN SERPL ELPH-MCNC: 3.7 G/DL — SIGNIFICANT CHANGE UP (ref 3.3–5)
ALP SERPL-CCNC: 69 U/L — SIGNIFICANT CHANGE UP (ref 40–120)
ALT FLD-CCNC: 13 U/L — SIGNIFICANT CHANGE UP (ref 10–45)
ANION GAP SERPL CALC-SCNC: 13 MMOL/L — SIGNIFICANT CHANGE UP (ref 5–17)
APTT BLD: 119.3 SEC — HIGH (ref 27.5–35.5)
APTT BLD: 86.9 SEC — HIGH (ref 27.5–35.5)
AST SERPL-CCNC: 12 U/L — SIGNIFICANT CHANGE UP (ref 10–40)
BASOPHILS # BLD AUTO: 0.07 K/UL — SIGNIFICANT CHANGE UP (ref 0–0.2)
BASOPHILS NFR BLD AUTO: 1.2 % — SIGNIFICANT CHANGE UP (ref 0–2)
BILIRUB SERPL-MCNC: 0.4 MG/DL — SIGNIFICANT CHANGE UP (ref 0.2–1.2)
BLD GP AB SCN SERPL QL: NEGATIVE — SIGNIFICANT CHANGE UP
BUN SERPL-MCNC: 6 MG/DL — LOW (ref 7–23)
CALCIUM SERPL-MCNC: 9.2 MG/DL — SIGNIFICANT CHANGE UP (ref 8.4–10.5)
CHLORIDE SERPL-SCNC: 104 MMOL/L — SIGNIFICANT CHANGE UP (ref 96–108)
CHOLEST SERPL-MCNC: 182 MG/DL — SIGNIFICANT CHANGE UP
CO2 SERPL-SCNC: 23 MMOL/L — SIGNIFICANT CHANGE UP (ref 22–31)
CREAT SERPL-MCNC: 0.78 MG/DL — SIGNIFICANT CHANGE UP (ref 0.5–1.3)
DRVVT SCREEN TO CONFIRM RATIO: SIGNIFICANT CHANGE UP
EOSINOPHIL # BLD AUTO: 0.05 K/UL — SIGNIFICANT CHANGE UP (ref 0–0.5)
EOSINOPHIL NFR BLD AUTO: 0.8 % — SIGNIFICANT CHANGE UP (ref 0–6)
ESTIMATED AVERAGE GLUCOSE: 114 MG/DL — SIGNIFICANT CHANGE UP (ref 68–114)
GLUCOSE SERPL-MCNC: 98 MG/DL — SIGNIFICANT CHANGE UP (ref 70–99)
HCT VFR BLD CALC: 41.3 % — SIGNIFICANT CHANGE UP (ref 39–50)
HCYS SERPL-MCNC: 13.8 UMOL/L — SIGNIFICANT CHANGE UP
HDLC SERPL-MCNC: 37 MG/DL — LOW
HGB BLD-MCNC: 13.4 G/DL — SIGNIFICANT CHANGE UP (ref 13–17)
IMM GRANULOCYTES NFR BLD AUTO: 0.3 % — SIGNIFICANT CHANGE UP (ref 0–1.5)
INR BLD: 1.17 RATIO — HIGH (ref 0.88–1.16)
LA NT DPL PPP QL: 41.3 SEC — SIGNIFICANT CHANGE UP
LIPID PNL WITH DIRECT LDL SERPL: 125 MG/DL — HIGH
LYMPHOCYTES # BLD AUTO: 1.84 K/UL — SIGNIFICANT CHANGE UP (ref 1–3.3)
LYMPHOCYTES # BLD AUTO: 30.9 % — SIGNIFICANT CHANGE UP (ref 13–44)
MCHC RBC-ENTMCNC: 29.2 PG — SIGNIFICANT CHANGE UP (ref 27–34)
MCHC RBC-ENTMCNC: 32.4 GM/DL — SIGNIFICANT CHANGE UP (ref 32–36)
MCV RBC AUTO: 90 FL — SIGNIFICANT CHANGE UP (ref 80–100)
MONOCYTES # BLD AUTO: 0.54 K/UL — SIGNIFICANT CHANGE UP (ref 0–0.9)
MONOCYTES NFR BLD AUTO: 9.1 % — SIGNIFICANT CHANGE UP (ref 2–14)
NEUTROPHILS # BLD AUTO: 3.43 K/UL — SIGNIFICANT CHANGE UP (ref 1.8–7.4)
NEUTROPHILS NFR BLD AUTO: 57.7 % — SIGNIFICANT CHANGE UP (ref 43–77)
NON HDL CHOLESTEROL: 145 MG/DL — HIGH
NORMALIZED SCT PPP-RTO: 0.87 RATIO — SIGNIFICANT CHANGE UP (ref 0–1.16)
NORMALIZED SCT PPP-RTO: SIGNIFICANT CHANGE UP
NRBC # BLD: 0 /100 WBCS — SIGNIFICANT CHANGE UP (ref 0–0)
PLATELET # BLD AUTO: 217 K/UL — SIGNIFICANT CHANGE UP (ref 150–400)
POTASSIUM SERPL-MCNC: 3.4 MMOL/L — LOW (ref 3.5–5.3)
POTASSIUM SERPL-SCNC: 3.4 MMOL/L — LOW (ref 3.5–5.3)
PROT C ACT/NOR PPP: 87 % — SIGNIFICANT CHANGE UP (ref 74–150)
PROT SERPL-MCNC: 6.2 G/DL — SIGNIFICANT CHANGE UP (ref 6–8.3)
PROTHROM AB SERPL-ACNC: 13.9 SEC — HIGH (ref 10.6–13.6)
RBC # BLD: 4.59 M/UL — SIGNIFICANT CHANGE UP (ref 4.2–5.8)
RBC # FLD: 12 % — SIGNIFICANT CHANGE UP (ref 10.3–14.5)
RH IG SCN BLD-IMP: POSITIVE — SIGNIFICANT CHANGE UP
RH IG SCN BLD-IMP: POSITIVE — SIGNIFICANT CHANGE UP
SODIUM SERPL-SCNC: 140 MMOL/L — SIGNIFICANT CHANGE UP (ref 135–145)
TRIGL SERPL-MCNC: 102 MG/DL — SIGNIFICANT CHANGE UP
WBC # BLD: 5.95 K/UL — SIGNIFICANT CHANGE UP (ref 3.8–10.5)
WBC # FLD AUTO: 5.95 K/UL — SIGNIFICANT CHANGE UP (ref 3.8–10.5)

## 2021-08-26 PROCEDURE — 70450 CT HEAD/BRAIN W/O DYE: CPT | Mod: 26

## 2021-08-26 PROCEDURE — 74177 CT ABD & PELVIS W/CONTRAST: CPT | Mod: 26

## 2021-08-26 PROCEDURE — 71260 CT THORAX DX C+: CPT | Mod: 26

## 2021-08-26 PROCEDURE — 93970 EXTREMITY STUDY: CPT | Mod: 26

## 2021-08-26 PROCEDURE — 99231 SBSQ HOSP IP/OBS SF/LOW 25: CPT | Mod: GC

## 2021-08-26 RX ORDER — TRAMADOL HYDROCHLORIDE 50 MG/1
25 TABLET ORAL EVERY 6 HOURS
Refills: 0 | Status: DISCONTINUED | OUTPATIENT
Start: 2021-08-26 | End: 2021-08-27

## 2021-08-26 RX ORDER — ONDANSETRON 8 MG/1
4 TABLET, FILM COATED ORAL EVERY 6 HOURS
Refills: 0 | Status: DISCONTINUED | OUTPATIENT
Start: 2021-08-26 | End: 2021-08-26

## 2021-08-26 RX ORDER — POTASSIUM CHLORIDE 20 MEQ
20 PACKET (EA) ORAL ONCE
Refills: 0 | Status: COMPLETED | OUTPATIENT
Start: 2021-08-26 | End: 2021-08-26

## 2021-08-26 RX ORDER — MAGNESIUM SULFATE 500 MG/ML
2 VIAL (ML) INJECTION ONCE
Refills: 0 | Status: COMPLETED | OUTPATIENT
Start: 2021-08-26 | End: 2021-08-26

## 2021-08-26 RX ORDER — ACETAMINOPHEN 500 MG
1000 TABLET ORAL ONCE
Refills: 0 | Status: COMPLETED | OUTPATIENT
Start: 2021-08-26 | End: 2021-08-26

## 2021-08-26 RX ORDER — METOCLOPRAMIDE HCL 10 MG
10 TABLET ORAL ONCE
Refills: 0 | Status: COMPLETED | OUTPATIENT
Start: 2021-08-26 | End: 2021-08-26

## 2021-08-26 RX ORDER — POTASSIUM CHLORIDE 20 MEQ
40 PACKET (EA) ORAL ONCE
Refills: 0 | Status: DISCONTINUED | OUTPATIENT
Start: 2021-08-26 | End: 2021-08-26

## 2021-08-26 RX ORDER — ONDANSETRON 8 MG/1
4 TABLET, FILM COATED ORAL EVERY 4 HOURS
Refills: 0 | Status: DISCONTINUED | OUTPATIENT
Start: 2021-08-26 | End: 2021-09-01

## 2021-08-26 RX ORDER — DIPHENHYDRAMINE HCL 50 MG
25 CAPSULE ORAL ONCE
Refills: 0 | Status: COMPLETED | OUTPATIENT
Start: 2021-08-26 | End: 2021-08-26

## 2021-08-26 RX ORDER — ONDANSETRON 8 MG/1
4 TABLET, FILM COATED ORAL ONCE
Refills: 0 | Status: COMPLETED | OUTPATIENT
Start: 2021-08-26 | End: 2021-08-26

## 2021-08-26 RX ORDER — DABIGATRAN ETEXILATE MESYLATE 150 MG/1
150 CAPSULE ORAL
Refills: 0 | Status: DISCONTINUED | OUTPATIENT
Start: 2021-08-26 | End: 2021-09-01

## 2021-08-26 RX ADMIN — ONDANSETRON 4 MILLIGRAM(S): 8 TABLET, FILM COATED ORAL at 15:13

## 2021-08-26 RX ADMIN — TRAMADOL HYDROCHLORIDE 25 MILLIGRAM(S): 50 TABLET ORAL at 20:22

## 2021-08-26 RX ADMIN — DABIGATRAN ETEXILATE MESYLATE 150 MILLIGRAM(S): 150 CAPSULE ORAL at 14:33

## 2021-08-26 RX ADMIN — HEPARIN SODIUM 8 UNIT(S)/HR: 5000 INJECTION INTRAVENOUS; SUBCUTANEOUS at 07:44

## 2021-08-26 RX ADMIN — Medication 1000 MILLIGRAM(S): at 00:00

## 2021-08-26 RX ADMIN — TRAMADOL HYDROCHLORIDE 25 MILLIGRAM(S): 50 TABLET ORAL at 12:45

## 2021-08-26 RX ADMIN — TRAMADOL HYDROCHLORIDE 25 MILLIGRAM(S): 50 TABLET ORAL at 21:00

## 2021-08-26 RX ADMIN — Medication 400 MILLIGRAM(S): at 08:07

## 2021-08-26 RX ADMIN — ONDANSETRON 4 MILLIGRAM(S): 8 TABLET, FILM COATED ORAL at 12:50

## 2021-08-26 RX ADMIN — Medication 400 MILLIGRAM(S): at 22:44

## 2021-08-26 RX ADMIN — Medication 50 MILLIEQUIVALENT(S): at 09:05

## 2021-08-26 RX ADMIN — Medication 1000 MILLIGRAM(S): at 23:20

## 2021-08-26 RX ADMIN — Medication 10 MILLIGRAM(S): at 15:42

## 2021-08-26 RX ADMIN — TRAMADOL HYDROCHLORIDE 25 MILLIGRAM(S): 50 TABLET ORAL at 12:16

## 2021-08-26 RX ADMIN — Medication 400 MILLIGRAM(S): at 15:32

## 2021-08-26 RX ADMIN — DABIGATRAN ETEXILATE MESYLATE 150 MILLIGRAM(S): 150 CAPSULE ORAL at 23:06

## 2021-08-26 RX ADMIN — Medication 25 MILLIGRAM(S): at 22:39

## 2021-08-26 RX ADMIN — Medication 1000 MILLIGRAM(S): at 08:40

## 2021-08-26 RX ADMIN — HEPARIN SODIUM 8 UNIT(S)/HR: 5000 INJECTION INTRAVENOUS; SUBCUTANEOUS at 01:13

## 2021-08-26 RX ADMIN — Medication 50 GRAM(S): at 15:43

## 2021-08-26 RX ADMIN — SODIUM CHLORIDE 100 MILLILITER(S): 9 INJECTION INTRAMUSCULAR; INTRAVENOUS; SUBCUTANEOUS at 14:25

## 2021-08-26 RX ADMIN — Medication 10 MILLIGRAM(S): at 22:39

## 2021-08-26 RX ADMIN — Medication 1000 MILLIGRAM(S): at 16:00

## 2021-08-26 NOTE — OCCUPATIONAL THERAPY INITIAL EVALUATION ADULT - LIVES WITH, PROFILE
Per pt report, lives in 2nd story walkup apt with wife, +18 steps to ascend with +rail, tub shower without grab bars or shower chair,/children/spouse

## 2021-08-26 NOTE — PROGRESS NOTE ADULT - ASSESSMENT
PAULO MARTI  41M no sig PMHx, xfer [facility] for stroke tx of extensive CVST, pw sudden onset WHOL AM 8/25 a/w blurry vision and 1x vomiting. Continues to have severe frontal and retroorbital HA, and R-sided neck pain. CTH non con w/ hyperdense SSS and b/l TS, no ICH. CTA/V w/ diffuse VST involving posterior 2/3 SSS, b/l TS, R sigmoid sinus into IJ, patent deep venous system.    - ADM Stroke, q2h neuro checks  - hep gtt, goal 60-90 per neuro, last PTT supratx 124  - MRI/MRV +C pending, CTH for any acute change in exam  - preop for possible angio in AM, keep NPO at midnight, consented  - keep on fluids/hydrated  - AEDs per neurology  - ophtho saw, no papilledema PAULO MARTI  41M no sig PMHx, xfer [facility] for stroke tx of extensive CVST, pw sudden onset WHOL AM 8/25 a/w blurry vision and 1x vomiting. Continues to have severe frontal and retroorbital HA, and R-sided neck pain. CTH non con w/ hyperdense SSS and b/l TS, no ICH. CTA/V w/ diffuse VST involving posterior 2/3 SSS, b/l TS, R sigmoid sinus into IJ, patent deep venous system.    - ADM Stroke, q2h neuro checks  - hep gtt, goal 60-90 per neuro, last PTT supratx 124  - MRI/MRV +C pending, CTH for any acute change in exam  - No acute need for cerebral angiogram  - keep on fluids/hydrated  - AEDs per neurology  - ophtho saw, no papilledema

## 2021-08-26 NOTE — OCCUPATIONAL THERAPY INITIAL EVALUATION ADULT - IADL RETRAINING, OT EVAL
Goal: Patient will increase standing tolerance to 5 minutes in preparation for safe toileting within 2 weeks.

## 2021-08-26 NOTE — CHART NOTE - NSCHARTNOTEFT_GEN_A_CORE
Obtain screening lower extremity venous ultrasound in patients who meet 1 or more of the following criteria as patient is high risk for DVT/PE on admission:   [] History of DVT/PE  []Hypercoagulable states (Factor V Leiden, Cancer, OCP, etc. )  []Prolonged immobility (hemiplegia/hemiparesis/post operative or any other extended immobilization)  [] Transferred from outside facility (Rehab or Long term care)  [x] Age </= to 50    LE doppler pending

## 2021-08-26 NOTE — PHYSICAL THERAPY INITIAL EVALUATION ADULT - REFERRING PHYSICIAN, REHAB EVAL
Pt wanted to walk, but bp was only 99/58, message dr. Lizzeth Waterman. Ahmed, he said to do orthostatics. VS in the computer, not positive, let pt walk the mckenzie with . PT Orders: PT Evaluate and treat

## 2021-08-26 NOTE — SWALLOW BEDSIDE ASSESSMENT ADULT - ADDITIONAL RECOMMENDATIONS
***Per discussion with MEKA Griffin, bedside swallow no longer warranted. This service to sign-off at this time. If warranted, suggest re-consult.

## 2021-08-26 NOTE — SWALLOW BEDSIDE ASSESSMENT ADULT - SWALLOW EVAL: DIAGNOSIS
41M no sig PMHx, xfer [facility] for stroke tx of extensive CVST, pw sudden onset WHOL AM 8/25 a/w blurry vision and 1x vomiting. Continues to have severe frontal and retroorbital HA, and R-sided neck pain. CTH non con w/ hyperdense SSS and b/l TS, no ICH. CTA/V w/ diffuse VST involving posterior 2/3 SSS, b/l TS, R sigmoid sinus into IJ, patent deep venous system. Pt currently NPO for scheduled angio later today. Therefore, bedside swallow deferred at this time.
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
## 2021-08-26 NOTE — PHYSICAL THERAPY INITIAL EVALUATION ADULT - ADDITIONAL COMMENTS
Pt. lives in 2nd flr walk up apt. with girl friend and son.  Patient ambulated without AD independent.

## 2021-08-26 NOTE — CONSULT NOTE ADULT - SUBJECTIVE AND OBJECTIVE BOX
MRN [id number]    Outpt Providers:     CC:   CHADPAULO SANCHEZ is a 41yMale pw Patient is a 41y old  Male who presents with a chief complaint of cvst (25 Aug 2021 22:25)        INTERVAL HPI/OVERNIGHT EVENTS:      REVIEW OF SYSTEMS:  CONSTITUTIONAL: No weakness. No fevers. No chills. No rigors. No weight loss. No night sweats. No diaphoresis. No poor appetite.  EYES: No blurry vision. No double vision. No eye pain.  ENT: No hearing difficulty. No vertigo. No dysphagia. No sore throat. No Sinusitis/rhinorrhea.   NECK: No pain. No stiffness/rigidity.  CARDIAC: No chest pain. No palpitations. No lightheadedness. No syncope.  RESPIRATORY: No cough. No SOB. No hemoptysis.  GASTROINTESTINAL: No abdominal pain. No nausea. No vomiting. No hematemesis. No diarrhea. No constipation. No melena. No hematochezia.  GENITOURINARY: No dysuria. No frequency. No hesitancy. No hematuria. No oliguria.  NEUROLOGICAL: No numbness/tingling. No focal weakness. No urinary or fecal incontinence. No headache. No unsteady gait.  BACK: No back pain. No flank pain.  EXTREMITIES: No lower extremity edema. Full ROM. No joint pain.  SKIN: No rashes. No itching. No other lesions.  PSYCHIATRIC: No depression. No anxiety. No SI/HI.  ALLERGIC: No lip swelling. No hives.  All other review of systems is negative unless indicated above.  Unless indicated above, unable to assess ROS 2/2    Social History:    Marital Status: (  ) , (  ) Single, (  ) , (  ) , (  )   # of Children:   Lives with: (  ) alone, (  ) children, (  ) spouse, (  ) parents, (  ) siblings, (  ) friends, (  ) other:   Occupation:     Substance Use/Illicit Drugs: (  ) never used vs other:   Tobacco Usage: (  ) never smoked, (  ) former smoker, (  ) current smoker and Total Pack-Years:   Last Alcohol Usage/Frequency/Amount/Withdrawal/Hx of Abuse:    Foreign travel:   Animal exposure:     Family History:      PMHx/PSHx:  PAST MEDICAL & SURGICAL HISTORY:  Herniated cervical disc    S/P shoulder surgery  R shoulder        Allergies: No Known Allergies    MEDICATIONS  (STANDING):  acetaminophen  IVPB .. 1000 milliGRAM(s) IV Intermittent once  heparin  Infusion 1000 Unit(s)/Hr (9 mL/Hr) IV Continuous <Continuous>  sodium chloride 0.9%. 1000 milliLiter(s) (100 mL/Hr) IV Continuous <Continuous>    MEDICATIONS  (PRN):      Vital Signs: T(C): 37 (08-25-21 @ 20:00), Max: 37 (08-25-21 @ 20:00)  HR: 62 (08-25-21 @ 22:00) (62 - 82)  BP: 130/89 (08-25-21 @ 22:00) (114/80 - 139/67)  RR: 20 (08-25-21 @ 22:00) (16 - 24)  SpO2: 99% (08-25-21 @ 22:00) (98% - 100%)  Wt(kg): --Vital Signs Last 24 Hrs  T(C): 37 (25 Aug 2021 20:00), Max: 37 (25 Aug 2021 20:00)  T(F): 98.6 (25 Aug 2021 20:00), Max: 98.6 (25 Aug 2021 20:00)  HR: 62 (25 Aug 2021 22:00) (62 - 82)  BP: 130/89 (25 Aug 2021 22:00) (114/80 - 139/67)  BP(mean): 101 (25 Aug 2021 22:00) (99 - 105)  RR: 20 (25 Aug 2021 22:00) (16 - 24)  SpO2: 99% (25 Aug 2021 22:00) (98% - 100%)  I&O:   08-25 @ 07:01  -  08-26 @ 00:13  --------------------------------------------------------  IN: 556 mL / OUT: 750 mL / NET: -194 mL        PHYSICAL EXAM:   GENERAL: Alert. Not confused. No acute distress. Not thin. Not cachectic. Not obese.  HEAD:  Atraumatic. Normocephalic.  EYES: EOMI. PERRLA. Normal conjunctiva/sclera.  ENT: Neck supple. No JVD. Moist oral mucosa. Not edentulous. No thrush.  LYMPH: Normal supraclavicular/cervical lymph nodes.   CARDIAC: Not tachy, Not raquel. Regular rhythm. Not irregularly irregular. S1. S2. No murmur. No rub. No distant heart sounds.  LUNG/CHEST: CTAB. BS equal bilaterally. No wheezes. No rales. No rhonchi.  ABDOMEN: Soft. No tenderness. No distension. No fluid wave. Normal bowel sounds.  BACK: No midline/vertebral tenderness. No flank tenderness.  VASCULAR: +2 b/l radial or ulnar pulses. Palpable DP pulses.  EXTREMITIES:  No clubbing. No cyanosis. No edema. Moving all 4.  NEUROLOGY: A&Ox3. Non-focal exam. Cranial nerves intact. Normal speech. Sensation intact.  PSYCH: Normal behavior. Normal affect.  SKIN: No jaundice. No erythema. No rash/lesion.  Vascular Access:     ICU Vital Signs Last 24 Hrs  T(C): 37 (25 Aug 2021 20:00), Max: 37 (25 Aug 2021 20:00)  T(F): 98.6 (25 Aug 2021 20:00), Max: 98.6 (25 Aug 2021 20:00)  HR: 62 (25 Aug 2021 22:00) (62 - 82)  BP: 130/89 (25 Aug 2021 22:00) (114/80 - 139/67)  BP(mean): 101 (25 Aug 2021 22:00) (99 - 105)  ABP: --  ABP(mean): --  RR: 20 (25 Aug 2021 22:00) (16 - 24)  SpO2: 99% (25 Aug 2021 22:00) (98% - 100%)      I&O's Summary    25 Aug 2021 07:01  -  26 Aug 2021 00:13  --------------------------------------------------------  IN: 556 mL / OUT: 750 mL / NET: -194 mL          Personally reviewed imaging, labs, EKG.    LABS:                        14.0   6.71  )-----------( 239      ( 25 Aug 2021 18:41 )             42.7     08-25    139  |  103  |  7   ----------------------------<  97  4.7   |  21<L>  |  0.67    Ca    9.7      25 Aug 2021 18:41    TPro  7.5  /  Alb  4.6  /  TBili  0.3  /  DBili  x   /  AST  21  /  ALT  19  /  AlkPhos  72  08-25    PT/INR - ( 25 Aug 2021 05:27 )   PT: 12.8 sec;   INR: 1.12 ratio         PTT - ( 25 Aug 2021 18:41 )  PTT:124.6 sec    CAPILLARY BLOOD GLUCOSE                RADIOLOGY & ADDITIONAL TESTS:    Imaging Personally Reviewed:  [x] YES  [] NO    Consultant(s) Notes Reviewed:  [x] YES  [] NO  Care Discussed with Consultants/Primary Team [x] YES  [] NO    Assessment:  PAULO MARTI is a 41yMale pw Patient is a 41y old  Male who presents with a chief complaint of cvst (25 Aug 2021 22:25)      1.       Pt signed out to NP.       Pt was evaluated prior to midnight on 21.    MRN [id number]    CC: headache, neck pain  41M Moldovan only speaking, c hx herniated cervical disc, right shoulder surgery (~4 mo ago), presented to [facility] with acute onset severe headache, now transferred to [facility] for NSG eval of dural venous sinus thrombosis.     #[id number].  Pt states he was in usual state of health until yesterday evening, at 10PM had severe sudden onset, 10/10 pain, worst headache of his life pain while he was watching TV. Pt called EMS right away, and EMS took him to [facility] at around 11PM. Pt states his headache has since subsequently improved, but he still has severe right neck pain preventing him from turning his head. Pt also states the pain is severe behind his eyes. Pt denies photophobia, phonophobia, fevers, chills, blurry vision. Never had HA like this before. Denies trauma or falls, drugs, history of blood clots, recent travel out of the country, covid infection. Pt denies receiving covid vaccine.    INTERVAL HPI/OVERNIGHT EVENTS:  HA improved, but still having right neck pain/stiffness preventing him from turning neck. Vision is only blurry now because of eye exam. Did not have blurry vision before the exam.      REVIEW OF SYSTEMS:  CONSTITUTIONAL: No weakness. No fevers. No chills. No rigors. No weight loss. No night sweats. No diaphoresis. No poor appetite.  EYES: +blurry vision. No double vision. +behind eye pain.  ENT: No hearing difficulty. No vertigo. No dysphagia. No sore throat. No Sinusitis/rhinorrhea.   NECK: No pain. No stiffness/rigidity.  CARDIAC: No chest pain. No palpitations. No lightheadedness. No syncope.  RESPIRATORY: No cough. No SOB. No hemoptysis.  GASTROINTESTINAL: No abdominal pain. No nausea. No vomiting. No hematemesis. No diarrhea. No constipation.   GENITOURINARY: No dysuria. No frequency. No hesitancy. No hematuria. No oliguria.  NEUROLOGICAL: No numbness/tingling. No focal weakness. No urinary or fecal incontinence. No headache. No unsteady gait.  BACK: No back pain. No flank pain.  EXTREMITIES: No lower extremity edema. Full ROM. No joint pain.  SKIN: No rashes. No itching. No other lesions.  PSYCHIATRIC: No depression. No anxiety. No SI/HI.  ALLERGIC: No lip swelling. No hives.  All other review of systems is negative unless indicated above.  Unless indicated above, unable to assess ROS 2/2    Social History:    Marital Status: ( x ) , (  ) Single, (  ) , (  ) , (  )   # of Children: 0  Lives with: (  ) alone, (  ) children, ( x ) spouse, (  ) parents, (  ) siblings, (  ) friends, (  ) other:   Occupation: unemployed currently    Substance Use/Illicit Drugs: (  ) never used vs other:   Tobacco Usage: ( x ) never smoked, (  ) former smoker, (  ) current smoker and Total Pack-Years:   Last Alcohol Usage/Frequency/Amount/Withdrawal/Hx of Abuse:  socially 1 beer.  Foreign travel: none  Animal exposure:     Family History:  Father  of stomach cancer      PMHx/PSHx:  PAST MEDICAL & SURGICAL HISTORY:  Herniated cervical disc    S/P shoulder surgery  R shoulder        Allergies: No Known Allergies    Home Meds: None    MEDICATIONS  (STANDING):  acetaminophen  IVPB .. 1000 milliGRAM(s) IV Intermittent once  heparin  Infusion 1000 Unit(s)/Hr (9 mL/Hr) IV Continuous <Continuous>  sodium chloride 0.9%. 1000 milliLiter(s) (100 mL/Hr) IV Continuous <Continuous>    MEDICATIONS  (PRN):      Vital Signs: T(C): 37 (21 @ 20:00), Max: 37 (21 @ 20:00)  HR: 62 (21 @ 22:00) (62 - 82)  BP: 130/89 (21 @ 22:00) (114/80 - 139/67)  RR: 20 (21 @ 22:00) (16 - 24)  SpO2: 99% (21 @ 22:00) (98% - 100%)  Wt(kg): --Vital Signs Last 24 Hrs  T(C): 37 (25 Aug 2021 20:00), Max: 37 (25 Aug 2021 20:00)  T(F): 98.6 (25 Aug 2021 20:00), Max: 98.6 (25 Aug 2021 20:00)  HR: 62 (25 Aug 2021 22:00) (62 - 82)  BP: 130/89 (25 Aug 2021 22:00) (114/80 - 139/67)  BP(mean): 101 (25 Aug 2021 22:00) (99 - 105)  RR: 20 (25 Aug 2021 22:00) (16 - 24)  SpO2: 99% (25 Aug 2021 22:00) (98% - 100%)  I&O:    @ 07:01  -  - @ 00:13  --------------------------------------------------------  IN: 556 mL / OUT: 750 mL / NET: -194 mL        PHYSICAL EXAM:   GENERAL: Alert. Not confused. No acute distress. Not thin. Not cachectic. Not obese.  HEAD:  Atraumatic. Normocephalic.  EYES: EOMI. PERRLA. Normal conjunctiva/sclera.  ENT: Neck stiff 2/2 pain. No JVD. Moist oral mucosa. Not edentulous. No thrush.  LYMPH: Normal supraclavicular/cervical lymph nodes.   CARDIAC: Not tachy, Not arquel. Regular rhythm. Not irregularly irregular. S1. S2.  LUNG/CHEST: CTAB. BS equal bilaterally. No wheezes. No rales. No rhonchi.  ABDOMEN: Soft. No tenderness. No distension. No fluid wave. Normal bowel sounds.  BACK: No midline/vertebral tenderness. No flank tenderness.  VASCULAR: +2 b/l radial or ulnar pulses. Palpable DP pulses.  EXTREMITIES:  No clubbing. No cyanosis. No edema. Moving all 4.  NEUROLOGY: A&Ox3. Non-focal exam. Cranial nerves intact. Normal speech. Sensation intact.  PSYCH: Normal behavior. Normal affect.  SKIN: No jaundice. No erythema. No rash/lesion.  Vascular Access:     ICU Vital Signs Last 24 Hrs  T(C): 37 (25 Aug 2021 20:00), Max: 37 (25 Aug 2021 20:00)  T(F): 98.6 (25 Aug 2021 20:00), Max: 98.6 (25 Aug 2021 20:00)  HR: 62 (25 Aug 2021 22:00) (62 - 82)  BP: 130/89 (25 Aug 2021 22:00) (114/80 - 139/67)  BP(mean): 101 (25 Aug 2021 22:00) (99 - 105)  ABP: --  ABP(mean): --  RR: 20 (25 Aug 2021 22:00) (16 - 24)  SpO2: 99% (25 Aug 2021 22:00) (98% - 100%)      I&O's Summary    25 Aug 2021 07:01  -  26 Aug 2021 00:13  --------------------------------------------------------  IN: 556 mL / OUT: 750 mL / NET: -194 mL          Personally reviewed imaging, labs.    LABS:                        14.0   6.71  )-----------( 239      ( 25 Aug 2021 18:41 )             42.7     08-    139  |  103  |  7   ----------------------------<  97  4.7   |  21<L>  |  0.67    Ca    9.7      25 Aug 2021 18:41    TPro  7.5  /  Alb  4.6  /  TBili  0.3  /  DBili  x   /  AST  21  /  ALT  19  /  AlkPhos  72  08-25    PT/INR - ( 25 Aug 2021 05:27 )   PT: 12.8 sec;   INR: 1.12 ratio         PTT - ( 25 Aug 2021 18:41 )  PTT:124.6 sec    CAPILLARY BLOOD GLUCOSE                RADIOLOGY & ADDITIONAL TESTS:    Imaging Personally Reviewed:  [x] YES  [] NO    Consultant(s) Notes Reviewed:  [x] YES  [] NO  Care Discussed with Consultants/Primary Team [x] YES  [] NO    Assessment:  41M Moldovan only speaking, c hx herniated cervical disc, right shoulder surgery (~4 mo ago), pw acute onset severe headache 2/2 diffuse dural venous sinus thrombosis of unclear etiology.    1. dural venous sinus thrombosis  - cont hep gtt  - per primary team, awaiting pan CT scan to eval for malignancy  - hypercoag workup pending  - MRI brain pending  - NPO for poss cerebral angiogram by DUSTIN  - neuro checks q2h  - not currently on antiseizure meds  - seen by ophtho. no evidence of elevated intracranial pressure on fundoscopic exam per their note.  - RCRI score of 0. Pt is low kecia-op cardiac risk.  - pt used to play softball. has high functional capacity/>4 functional mets, no current CP. Never had any cardiac problems. Had anesthesia 2/2 right shoulder surgery in past. No family history of heart disease. No drugs or exposure to chemicals.  - no medical contraindication to proceeding with OR for necessary diagnostic and therapeutic procedures by DUSTIN Bella M.D.  [facility] Medicine Attending  Office Answering Service: [phone]   Pager: [phone] (Only available from 8PM until 8AM)       Pt was evaluated prior to midnight on 21.    MRN [id number]    CC: headache, neck pain  41M [place] Islander only speaking, c hx herniated cervical disc, right shoulder surgery (~4 mo ago), presented to [facility] with acute onset severe headache, now transferred to [facility] for NSG eval of dural venous sinus thrombosis.     #[id number].  Pt states he was in usual state of health until yesterday evening, at 10PM had severe sudden onset, 10/10 pain, worst headache of his life pain while he was watching TV. Pt called EMS right away, and EMS took him to [facility] at around 11PM. Pt states his headache has since subsequently improved, but he still has severe right neck pain preventing him from turning his head. Pt also states the pain is severe behind his eyes. Pt denies photophobia, phonophobia, fevers, chills, blurry vision. Never had HA like this before. Denies trauma or falls, drugs, history of blood clots, recent travel out of the country, covid infection. Pt denies receiving covid vaccine.    INTERVAL HPI/OVERNIGHT EVENTS:  HA improved, but still having right neck pain/stiffness preventing him from turning neck. Vision is only blurry now because of eye exam. Did not have blurry vision before the exam.      REVIEW OF SYSTEMS:  CONSTITUTIONAL: No weakness. No fevers. No chills. No rigors. No weight loss. No night sweats. No diaphoresis. No poor appetite.  EYES: +blurry vision. No double vision. +behind eye pain.  ENT: No hearing difficulty. No vertigo. No dysphagia. No sore throat. No Sinusitis/rhinorrhea.   NECK: No pain. No stiffness/rigidity.  CARDIAC: No chest pain. No palpitations. No lightheadedness. No syncope.  RESPIRATORY: No cough. No SOB. No hemoptysis.  GASTROINTESTINAL: No abdominal pain. No nausea. No vomiting. No hematemesis. No diarrhea. No constipation.   GENITOURINARY: No dysuria. No frequency. No hesitancy. No hematuria. No oliguria.  NEUROLOGICAL: No numbness/tingling. No focal weakness. No urinary or fecal incontinence. No headache. No unsteady gait.  BACK: No back pain. No flank pain.  EXTREMITIES: No lower extremity edema. Full ROM. No joint pain.  SKIN: No rashes. No itching. No other lesions.  PSYCHIATRIC: No depression. No anxiety. No SI/HI.  ALLERGIC: No lip swelling. No hives.  All other review of systems is negative unless indicated above.  Unless indicated above, unable to assess ROS 2/2    Social History:    Marital Status: ( x ) , (  ) Single, (  ) , (  ) , (  )   # of Children: 0  Lives with: (  ) alone, (  ) children, ( x ) spouse, (  ) parents, (  ) siblings, (  ) friends, (  ) other:   Occupation: unemployed currently    Substance Use/Illicit Drugs: (  ) never used vs other:   Tobacco Usage: ( x ) never smoked, (  ) former smoker, (  ) current smoker and Total Pack-Years:   Last Alcohol Usage/Frequency/Amount/Withdrawal/Hx of Abuse:  socially 1 beer.  Foreign travel: none  Animal exposure:     Family History:  Father  of stomach cancer      PMHx/PSHx:  PAST MEDICAL & SURGICAL HISTORY:  Herniated cervical disc    S/P shoulder surgery  R shoulder        Allergies: No Known Allergies    Home Meds: None    MEDICATIONS  (STANDING):  acetaminophen  IVPB .. 1000 milliGRAM(s) IV Intermittent once  heparin  Infusion 1000 Unit(s)/Hr (9 mL/Hr) IV Continuous <Continuous>  sodium chloride 0.9%. 1000 milliLiter(s) (100 mL/Hr) IV Continuous <Continuous>    MEDICATIONS  (PRN):      Vital Signs: T(C): 37 (21 @ 20:00), Max: 37 (21 @ 20:00)  HR: 62 (21 @ 22:00) (62 - 82)  BP: 130/89 (21 @ 22:00) (114/80 - 139/67)  RR: 20 (21 @ 22:00) (16 - 24)  SpO2: 99% (21 @ 22:00) (98% - 100%)  Wt(kg): --Vital Signs Last 24 Hrs  T(C): 37 (25 Aug 2021 20:00), Max: 37 (25 Aug 2021 20:00)  T(F): 98.6 (25 Aug 2021 20:00), Max: 98.6 (25 Aug 2021 20:00)  HR: 62 (25 Aug 2021 22:00) (62 - 82)  BP: 130/89 (25 Aug 2021 22:00) (114/80 - 139/67)  BP(mean): 101 (25 Aug 2021 22:00) (99 - 105)  RR: 20 (25 Aug 2021 22:00) (16 - 24)  SpO2: 99% (25 Aug 2021 22:00) (98% - 100%)  I&O:    @ 07:01  -  - @ 00:13  --------------------------------------------------------  IN: 556 mL / OUT: 750 mL / NET: -194 mL        PHYSICAL EXAM:   GENERAL: Alert. Not confused. No acute distress. Not thin. Not cachectic. Not obese.  HEAD:  Atraumatic. Normocephalic.  EYES: EOMI. PERRLA. Normal conjunctiva/sclera.  ENT: Neck stiff 2/2 pain. No JVD. Moist oral mucosa. Not edentulous. No thrush.  LYMPH: Normal supraclavicular/cervical lymph nodes.   CARDIAC: Not tachy, Not raquel. Regular rhythm. Not irregularly irregular. S1. S2.  LUNG/CHEST: CTAB. BS equal bilaterally. No wheezes. No rales. No rhonchi.  ABDOMEN: Soft. No tenderness. No distension. No fluid wave. Normal bowel sounds.  BACK: No midline/vertebral tenderness. No flank tenderness.  VASCULAR: +2 b/l radial or ulnar pulses. Palpable DP pulses.  EXTREMITIES:  No clubbing. No cyanosis. No edema. Moving all 4.  NEUROLOGY: A&Ox3. Non-focal exam. Cranial nerves intact. Normal speech. Sensation intact.  PSYCH: Normal behavior. Normal affect.  SKIN: No jaundice. No erythema. No rash/lesion.  Vascular Access:     ICU Vital Signs Last 24 Hrs  T(C): 37 (25 Aug 2021 20:00), Max: 37 (25 Aug 2021 20:00)  T(F): 98.6 (25 Aug 2021 20:00), Max: 98.6 (25 Aug 2021 20:00)  HR: 62 (25 Aug 2021 22:00) (62 - 82)  BP: 130/89 (25 Aug 2021 22:00) (114/80 - 139/67)  BP(mean): 101 (25 Aug 2021 22:00) (99 - 105)  ABP: --  ABP(mean): --  RR: 20 (25 Aug 2021 22:00) (16 - 24)  SpO2: 99% (25 Aug 2021 22:00) (98% - 100%)      I&O's Summary    25 Aug 2021 07:01  -  26 Aug 2021 00:13  --------------------------------------------------------  IN: 556 mL / OUT: 750 mL / NET: -194 mL          Personally reviewed imaging, labs.    LABS:                        14.0   6.71  )-----------( 239      ( 25 Aug 2021 18:41 )             42.7     08-25    139  |  103  |  7   ----------------------------<  97  4.7   |  21<L>  |  0.67    Ca    9.7      25 Aug 2021 18:41    TPro  7.5  /  Alb  4.6  /  TBili  0.3  /  DBili  x   /  AST  21  /  ALT  19  /  AlkPhos  72  08-25    PT/INR - ( 25 Aug 2021 05:27 )   PT: 12.8 sec;   INR: 1.12 ratio         PTT - ( 25 Aug 2021 18:41 )  PTT:124.6 sec    CAPILLARY BLOOD GLUCOSE                RADIOLOGY & ADDITIONAL TESTS:    Imaging Personally Reviewed:  [x] YES  [] NO    Consultant(s) Notes Reviewed:  [x] YES  [] NO  Care Discussed with Consultants/Primary Team [x] YES  [] NO    Assessment:  41M [place] Islander only speaking, c hx herniated cervical disc, right shoulder surgery (~4 mo ago), pw acute onset severe headache 2/2 diffuse dural venous sinus thrombosis of unclear etiology.    1. dural venous sinus thrombosis  - cont hep gtt  - per primary team, awaiting pan CT scan to eval for malignancy  - agree with hypercoag workup. Ordered apls panel, homocysteine. Please note that heparin gtt will affect some hypercoag values, such as DRVVT and antithrombin. Defer interpretation of hypercoag results to heme. Also primary team to obtain consent before ordering hypercoag genetic tests.  - MRI brain/orbits pending  - NPO for poss cerebral angiogram by DUSTIN  - neuro checks q2h  - not currently on antiseizure meds  - seen by ophtho. no evidence of elevated intracranial pressure on fundoscopic exam per their note.  - RCRI score of 0. Pt is low kecia-op cardiac risk.  - pt used to play softball. has high functional capacity/>4 functional mets, no current CP. Never had any cardiac problems. Had anesthesia 2/2 right shoulder surgery in past. No family history of heart disease. No drugs or exposure to chemicals.  - no medical contraindication to proceeding with OR for any necessary diagnostic and therapeutic procedures by DUSTIN Bella M.D.  [facility] Medicine Attending  Office Answering Service: [phone]   Pager: [phone] (Only available from 8PM until 8AM)

## 2021-08-26 NOTE — SWALLOW BEDSIDE ASSESSMENT ADULT - COMMENTS
MS: Awake, alert, oriented to person, place, situation. Normal affect. Follows all commands.  Language: Speech is clear, fluent with comprehension  CNs: PERRL (R = 3mm, L = 3mm), no APD. CVF full. EOMI no nystagmus. V1-3 intact to LT b/l. No facial asymmetry b/l. Hearing grossly normal (rubbing fingers) b/l. Symmetric palate elevation in midline. Gag reflex deferred. Tongue midline, normal movements, no atrophy.  Impression: Headache and vision changes secondary to extensive central venous sinus thromboses of unknown etiology
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
## 2021-08-26 NOTE — OCCUPATIONAL THERAPY INITIAL EVALUATION ADULT - ANTICIPATED DISCHARGE DISPOSITION, OT EVAL
Anticipate discharge recommendation Home with no skilled OT needs; Home with assist from spouse for functional mobility and ADL/IADL performance as needed/no needs/home w/ level of assist
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
## 2021-08-26 NOTE — OCCUPATIONAL THERAPY INITIAL EVALUATION ADULT - GENERAL OBSERVATIONS, REHAB EVAL
Pt received semisupine in bed, A+Ox4 in Chinese, spouse at bedside, communicated with bilingual OT t/o session, +stroke unit monitoring, BP cuff, tele, cont pulse ox, reports 6/10 headache premedicated prior to session, resolving nausea

## 2021-08-26 NOTE — PHYSICAL THERAPY INITIAL EVALUATION ADULT - PRECAUTIONS/LIMITATIONS, REHAB EVAL
CT HEAD: Hyperdense superior sagittal sinus due to acute venous sinus thrombosis. No acute intracranial bleeding. CTA BRAIN: Diffuse dural venous sinus thrombosis with clot involving the mid and posterior superior sagittal sinus, torcula, bilateral transverse and right sigmoid sinuses, and right internal jugular vein. Patent deep venous system. CT HEAD: Hyperdense superior sagittal sinus due to acute venous sinus thrombosis. No acute intracranial bleeding. CTA BRAIN: Diffuse dural venous sinus thrombosis with clot involving the mid and posterior superior sagittal sinus, torcula, bilateral transverse and right sigmoid sinuses, and right internal jugular vein. Patent deep venous system./no known precautions/limitations

## 2021-08-27 LAB
ANION GAP SERPL CALC-SCNC: 15 MMOL/L — SIGNIFICANT CHANGE UP (ref 5–17)
BUN SERPL-MCNC: 5 MG/DL — LOW (ref 7–23)
CALCIUM SERPL-MCNC: 9.1 MG/DL — SIGNIFICANT CHANGE UP (ref 8.4–10.5)
CHLORIDE SERPL-SCNC: 103 MMOL/L — SIGNIFICANT CHANGE UP (ref 96–108)
CO2 SERPL-SCNC: 21 MMOL/L — LOW (ref 22–31)
CREAT SERPL-MCNC: 0.74 MG/DL — SIGNIFICANT CHANGE UP (ref 0.5–1.3)
GLUCOSE SERPL-MCNC: 98 MG/DL — SIGNIFICANT CHANGE UP (ref 70–99)
HCT VFR BLD CALC: 39.7 % — SIGNIFICANT CHANGE UP (ref 39–50)
HGB BLD-MCNC: 13 G/DL — SIGNIFICANT CHANGE UP (ref 13–17)
MCHC RBC-ENTMCNC: 29 PG — SIGNIFICANT CHANGE UP (ref 27–34)
MCHC RBC-ENTMCNC: 32.7 GM/DL — SIGNIFICANT CHANGE UP (ref 32–36)
MCV RBC AUTO: 88.6 FL — SIGNIFICANT CHANGE UP (ref 80–100)
NRBC # BLD: 0 /100 WBCS — SIGNIFICANT CHANGE UP (ref 0–0)
PLATELET # BLD AUTO: 206 K/UL — SIGNIFICANT CHANGE UP (ref 150–400)
POTASSIUM SERPL-MCNC: 3.7 MMOL/L — SIGNIFICANT CHANGE UP (ref 3.5–5.3)
POTASSIUM SERPL-SCNC: 3.7 MMOL/L — SIGNIFICANT CHANGE UP (ref 3.5–5.3)
RBC # BLD: 4.48 M/UL — SIGNIFICANT CHANGE UP (ref 4.2–5.8)
RBC # FLD: 11.9 % — SIGNIFICANT CHANGE UP (ref 10.3–14.5)
SODIUM SERPL-SCNC: 139 MMOL/L — SIGNIFICANT CHANGE UP (ref 135–145)
WBC # BLD: 5.31 K/UL — SIGNIFICANT CHANGE UP (ref 3.8–10.5)
WBC # FLD AUTO: 5.31 K/UL — SIGNIFICANT CHANGE UP (ref 3.8–10.5)

## 2021-08-27 PROCEDURE — 70546 MR ANGIOGRAPH HEAD W/O&W/DYE: CPT | Mod: 26,59

## 2021-08-27 PROCEDURE — 70553 MRI BRAIN STEM W/O & W/DYE: CPT | Mod: 26

## 2021-08-27 PROCEDURE — 95816 EEG AWAKE AND DROWSY: CPT | Mod: 26

## 2021-08-27 PROCEDURE — 70543 MRI ORBT/FAC/NCK W/O &W/DYE: CPT | Mod: 26

## 2021-08-27 RX ORDER — ACETAMINOPHEN 500 MG
1000 TABLET ORAL ONCE
Refills: 0 | Status: COMPLETED | OUTPATIENT
Start: 2021-08-27 | End: 2021-08-27

## 2021-08-27 RX ORDER — MAGNESIUM SULFATE 500 MG/ML
2 VIAL (ML) INJECTION ONCE
Refills: 0 | Status: COMPLETED | OUTPATIENT
Start: 2021-08-27 | End: 2021-08-27

## 2021-08-27 RX ORDER — METOCLOPRAMIDE HCL 10 MG
10 TABLET ORAL EVERY 6 HOURS
Refills: 0 | Status: DISCONTINUED | OUTPATIENT
Start: 2021-08-27 | End: 2021-09-01

## 2021-08-27 RX ORDER — MAGNESIUM SULFATE 500 MG/ML
2 VIAL (ML) INJECTION EVERY 6 HOURS
Refills: 0 | Status: DISCONTINUED | OUTPATIENT
Start: 2021-08-27 | End: 2021-08-27

## 2021-08-27 RX ORDER — OXYCODONE AND ACETAMINOPHEN 5; 325 MG/1; MG/1
1 TABLET ORAL ONCE
Refills: 0 | Status: DISCONTINUED | OUTPATIENT
Start: 2021-08-27 | End: 2021-08-27

## 2021-08-27 RX ADMIN — Medication 50 GRAM(S): at 09:33

## 2021-08-27 RX ADMIN — Medication 104 MILLIGRAM(S): at 18:40

## 2021-08-27 RX ADMIN — Medication 400 MILLIGRAM(S): at 03:51

## 2021-08-27 RX ADMIN — OXYCODONE AND ACETAMINOPHEN 1 TABLET(S): 5; 325 TABLET ORAL at 15:54

## 2021-08-27 RX ADMIN — DABIGATRAN ETEXILATE MESYLATE 150 MILLIGRAM(S): 150 CAPSULE ORAL at 22:49

## 2021-08-27 RX ADMIN — Medication 1000 MILLIGRAM(S): at 10:52

## 2021-08-27 RX ADMIN — OXYCODONE AND ACETAMINOPHEN 1 TABLET(S): 5; 325 TABLET ORAL at 16:24

## 2021-08-27 RX ADMIN — Medication 1000 MILLIGRAM(S): at 04:30

## 2021-08-27 RX ADMIN — Medication 10 MILLIGRAM(S): at 09:35

## 2021-08-27 RX ADMIN — OXYCODONE AND ACETAMINOPHEN 1 TABLET(S): 5; 325 TABLET ORAL at 23:07

## 2021-08-27 RX ADMIN — DABIGATRAN ETEXILATE MESYLATE 150 MILLIGRAM(S): 150 CAPSULE ORAL at 11:04

## 2021-08-27 RX ADMIN — Medication 400 MILLIGRAM(S): at 09:46

## 2021-08-27 RX ADMIN — ONDANSETRON 4 MILLIGRAM(S): 8 TABLET, FILM COATED ORAL at 09:33

## 2021-08-27 RX ADMIN — SODIUM CHLORIDE 100 MILLILITER(S): 9 INJECTION INTRAMUSCULAR; INTRAVENOUS; SUBCUTANEOUS at 04:03

## 2021-08-27 NOTE — CONSULT NOTE ADULT - ASSESSMENT
41 year old man with dural venous sinus thrombosis, GI consulted for abnormal findings on abdominal CT    1. Abnormal CT showing disetended and folded appendix. Pt denies abd pain, nontender on exam. Suspect normal variant.  -serial abdominla exams, diet as tolerated    2. Venous sinus thrombosis        Advanced care planning forms were discussed. Code status including forceful chest compressions, defibrillation and intubation were discussed. The risks benefits and alternatives to pertinent gastrointestinal procedures and interventions were discussed in detail and all questions were answered. Duration: 15 Minutes.      Low Grant M.D.   Gastroenterology and Hepatology  [address] [place], [place]  Office: [phone]  Cell: [phone]

## 2021-08-27 NOTE — DIETITIAN INITIAL EVALUATION ADULT. - FACTORS AFF FOOD INTAKE
currently pt reports his appetite is returning and he is no longer nauseous but is being cautious with how much he is eating to make sure he does not get nauseous; reports his intake is gradually increasing, has been having mostly fruits and fruit juices; denies any chewing/swallowing issues
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
## 2021-08-27 NOTE — DIETITIAN INITIAL EVALUATION ADULT. - OTHER INFO
Pt reports usual wt of about 148-150 pounds, consistent c dosing wt of about 149.4 pounds.     Pt declined any supplements or preferences at this time.

## 2021-08-27 NOTE — PROVIDER CONTACT NOTE (OTHER) - ACTION/TREATMENT ORDERED:
provider notified. lower rate to 9 mL/hr
provider notified. change rate from 9 mL/hr to 8 mL/hr
IV Tylenol 1gm, Reglan 25mg IVP x 1 and metoclopramide IVP 10mg x1 ordered
Since tramadol is not helping him, Tylenol 1gm IVPB x1 ordered
Risks/benefits discussed with patient or patient surrogate

## 2021-08-27 NOTE — DIETITIAN INITIAL EVALUATION ADULT. - REASON FOR ADMISSION
WHOL; pt found to have venous sinus thrombosis. Noted bedside swallow evaluation was ordered but no longer warranted.
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
## 2021-08-27 NOTE — CONSULT NOTE ADULT - SUBJECTIVE AND OBJECTIVE BOX
Chief Complaint:  Patient is a 41y old  Male who presents with a chief complaint of cvst (27 Aug 2021 12:44)      Date of service: 08-27-21 @ 22:35    HPI:    The patient is a 41 year old man who presented with severe headache. He was found to have dural sinus venous thombosis.     The patient denies dysphagia, nausea and vomiting, abdominal pain, diarrhea, unintentional weight loss, change in bowel habits or NSAID use.    He has no histoy of GI disease  Allergies:  No Known Allergies      Home Medications:    Hospital Medications:  dabigatran 150 milliGRAM(s) Oral two times a day  metoclopramide Injectable 10 milliGRAM(s) IV Push every 6 hours PRN  ondansetron Injectable 4 milliGRAM(s) IV Push every 4 hours PRN  sodium chloride 0.9%. 1000 milliLiter(s) IV Continuous <Continuous>      PMHX/PSHX:  No pertinent past medical history    Herniated cervical disc    S/P shoulder surgery        Family history:      Social History:   Denies ethanol use.  Denies illicit drug use.    ROS:     General:  No wt loss, fevers, chills, night sweats, fatigue,   Eyes:  Good vision, no reported pain  ENT:  No sore throat, pain, runny nose, dysphagia  CV:  No pain, palpitations, hypo/hypertension  Resp:  No dyspnea, cough, tachypnea, wheezing  GI:  See HPI  :  No pain, bleeding, incontinence, nocturia  Muscle:  No pain, weakness  Neuro:  No weakness, tingling, memory problems  Psych:  No fatigue, insomnia, mood problems, depression  Endocrine:  No polyuria, polydipsia, cold/heat intolerance  Heme:  No petechiae, ecchymosis, easy bruisability  Integumentary:  No rash, edema      PHYSICAL EXAM:     GENERAL:  Appears stated age, well-groomed, well-nourished, no distress  HEENT:  NC/AT,  conjunctivae anicteric, clear and pink,   NECK: supple, trachea midline  CHEST:  Full & symmetric excursion, no increased effort, breath sounds clear  HEART:  Regular rhythm, no JVD  ABDOMEN:  Soft, non-tender, non-distended, normoactive bowel sounds,  no masses , no hepatosplenomegaly  EXTREMITIES:  no cyanosis,clubbing or edema  SKIN:  No rash, erythema, or, ecchymoses, no jaundice  NEURO:  Alert, non-focal, no asterixis  PSYCH: Appropriate affect, oriented to place and time  RECTAL: Deferred      Vital Signs:  Vital Signs Last 24 Hrs  T(C): 36.6 (27 Aug 2021 19:18), Max: 36.9 (27 Aug 2021 15:38)  T(F): 97.8 (27 Aug 2021 19:18), Max: 98.4 (27 Aug 2021 15:38)  HR: 69 (27 Aug 2021 19:18) (58 - 81)  BP: 126/84 (27 Aug 2021 19:18) (122/87 - 136/96)  BP(mean): 102 (27 Aug 2021 12:00) (98 - 108)  RR: 18 (27 Aug 2021 19:18) (13 - 22)  SpO2: 99% (27 Aug 2021 19:18) (97% - 100%)  Daily     Daily     LABS: Labs personally reviewed by me:                        13.0   5.31  )-----------( 206      ( 27 Aug 2021 05:51 )             39.7     08-27    139  |  103  |  5<L>  ----------------------------<  98  3.7   |  21<L>  |  0.74    Ca    9.1      27 Aug 2021 05:51    TPro  6.2  /  Alb  3.7  /  TBili  0.4  /  DBili  x   /  AST  12  /  ALT  13  /  AlkPhos  69  08-26    LIVER FUNCTIONS - ( 26 Aug 2021 06:57 )  Alb: 3.7 g/dL / Pro: 6.2 g/dL / ALK PHOS: 69 U/L / ALT: 13 U/L / AST: 12 U/L / GGT: x           PT/INR - ( 26 Aug 2021 06:53 )   PT: 13.9 sec;   INR: 1.17 ratio         PTT - ( 26 Aug 2021 06:53 )  PTT:86.9 sec        Imaging personally reviewed by me:

## 2021-08-27 NOTE — DIETITIAN INITIAL EVALUATION ADULT. - PERTINENT LABORATORY DATA
no 08-27 @ 05:51: Na 139, BUN 5<L>, Cr 0.74, BG 98, K+ 3.7, Phos --, Mg --, Alk Phos --, ALT/SGPT --, AST/SGOT --, HbA1c --

## 2021-08-27 NOTE — PROVIDER CONTACT NOTE (OTHER) - ASSESSMENT
C/o headache
C/o headache
no s/s of bleeding, no c/o discomfort. VS and neuro status stable
no s/s of bleeding. no c/o discomfort. VS and neuro status stable

## 2021-08-27 NOTE — DIETITIAN INITIAL EVALUATION ADULT. - PERTINENT MEDS FT
MEDICATIONS  (STANDING):  dabigatran 150 milliGRAM(s) Oral two times a day  sodium chloride 0.9%. 1000 milliLiter(s) (100 mL/Hr) IV Continuous <Continuous>    MEDICATIONS  (PRN):  metoclopramide Injectable 10 milliGRAM(s) IV Push every 6 hours PRN nausea/vomiting  ondansetron Injectable 4 milliGRAM(s) IV Push every 4 hours PRN Nausea and/or Vomiting

## 2021-08-27 NOTE — DIETITIAN INITIAL EVALUATION ADULT. - ORAL INTAKE PTA/DIET HISTORY
Pt reports good appetite and intake PTA except for the day of admission since he had a very bad headache and then did become nauseous and vomiting when he came to the ED. Reports prior to that he was consuming regular meals, NKFA, and reported no micronutrient coverage at home.

## 2021-08-27 NOTE — PROGRESS NOTE ADULT - ASSESSMENT
PAULO MARTI  41M no sig PMHx, xfer [facility] for stroke tx of extensive CVST, pw sudden onset WHOL AM 8/25 a/w blurry vision and 1x vomiting. Continues to have severe frontal and retroorbital HA, and R-sided neck pain. CTH non con w/ hyperdense SSS and b/l TS, no ICH. CTA/V w/ diffuse VST involving posterior 2/3 SSS, b/l TS, R sigmoid sinus into IJ, patent deep venous system.    - ADM Stroke, q2h neuro checks  - hep gtt, goal 60-90 per neuro, last PTT supratx 124  - MRI/MRV +C pending, CTH for any acute change in exam  - AEDs per neurology  - ophtho saw, no papilledema

## 2021-08-28 LAB
ANION GAP SERPL CALC-SCNC: 16 MMOL/L — SIGNIFICANT CHANGE UP (ref 5–17)
BUN SERPL-MCNC: 6 MG/DL — LOW (ref 7–23)
CALCIUM SERPL-MCNC: 9.7 MG/DL — SIGNIFICANT CHANGE UP (ref 8.4–10.5)
CHLORIDE SERPL-SCNC: 99 MMOL/L — SIGNIFICANT CHANGE UP (ref 96–108)
CO2 SERPL-SCNC: 22 MMOL/L — SIGNIFICANT CHANGE UP (ref 22–31)
CREAT SERPL-MCNC: 0.61 MG/DL — SIGNIFICANT CHANGE UP (ref 0.5–1.3)
GLUCOSE SERPL-MCNC: 122 MG/DL — HIGH (ref 70–99)
HCT VFR BLD CALC: 40.5 % — SIGNIFICANT CHANGE UP (ref 39–50)
HGB BLD-MCNC: 13.6 G/DL — SIGNIFICANT CHANGE UP (ref 13–17)
MCHC RBC-ENTMCNC: 29.4 PG — SIGNIFICANT CHANGE UP (ref 27–34)
MCHC RBC-ENTMCNC: 33.6 GM/DL — SIGNIFICANT CHANGE UP (ref 32–36)
MCV RBC AUTO: 87.5 FL — SIGNIFICANT CHANGE UP (ref 80–100)
NRBC # BLD: 0 /100 WBCS — SIGNIFICANT CHANGE UP (ref 0–0)
PLATELET # BLD AUTO: 201 K/UL — SIGNIFICANT CHANGE UP (ref 150–400)
POTASSIUM SERPL-MCNC: 4.2 MMOL/L — SIGNIFICANT CHANGE UP (ref 3.5–5.3)
POTASSIUM SERPL-SCNC: 4.2 MMOL/L — SIGNIFICANT CHANGE UP (ref 3.5–5.3)
PROT S FREE PPP-ACNC: 110 % — SIGNIFICANT CHANGE UP (ref 63–140)
RBC # BLD: 4.63 M/UL — SIGNIFICANT CHANGE UP (ref 4.2–5.8)
RBC # FLD: 11.9 % — SIGNIFICANT CHANGE UP (ref 10.3–14.5)
SODIUM SERPL-SCNC: 137 MMOL/L — SIGNIFICANT CHANGE UP (ref 135–145)
WBC # BLD: 4.92 K/UL — SIGNIFICANT CHANGE UP (ref 3.8–10.5)
WBC # FLD AUTO: 4.92 K/UL — SIGNIFICANT CHANGE UP (ref 3.8–10.5)

## 2021-08-28 RX ORDER — ACETAMINOPHEN 500 MG
1000 TABLET ORAL ONCE
Refills: 0 | Status: COMPLETED | OUTPATIENT
Start: 2021-08-28 | End: 2021-08-29

## 2021-08-28 RX ORDER — DIVALPROEX SODIUM 500 MG/1
250 TABLET, DELAYED RELEASE ORAL ONCE
Refills: 0 | Status: COMPLETED | OUTPATIENT
Start: 2021-08-28 | End: 2021-08-28

## 2021-08-28 RX ORDER — MAGNESIUM SULFATE 500 MG/ML
2 VIAL (ML) INJECTION ONCE
Refills: 0 | Status: COMPLETED | OUTPATIENT
Start: 2021-08-28 | End: 2021-08-28

## 2021-08-28 RX ORDER — VALPROIC ACID (AS SODIUM SALT) 250 MG/5ML
250 SOLUTION, ORAL ORAL ONCE
Refills: 0 | Status: COMPLETED | OUTPATIENT
Start: 2021-08-28 | End: 2021-08-28

## 2021-08-28 RX ORDER — ACETAZOLAMIDE 250 MG/1
500 TABLET ORAL
Refills: 0 | Status: DISCONTINUED | OUTPATIENT
Start: 2021-08-28 | End: 2021-09-01

## 2021-08-28 RX ORDER — MAGNESIUM SULFATE 500 MG/ML
2 VIAL (ML) INJECTION ONCE
Refills: 0 | Status: DISCONTINUED | OUTPATIENT
Start: 2021-08-28 | End: 2021-08-28

## 2021-08-28 RX ADMIN — Medication 100 MILLIGRAM(S): at 09:13

## 2021-08-28 RX ADMIN — ONDANSETRON 4 MILLIGRAM(S): 8 TABLET, FILM COATED ORAL at 07:17

## 2021-08-28 RX ADMIN — OXYCODONE AND ACETAMINOPHEN 1 TABLET(S): 5; 325 TABLET ORAL at 00:00

## 2021-08-28 RX ADMIN — Medication 1 DROP(S): at 12:48

## 2021-08-28 RX ADMIN — DABIGATRAN ETEXILATE MESYLATE 150 MILLIGRAM(S): 150 CAPSULE ORAL at 12:47

## 2021-08-28 RX ADMIN — DIVALPROEX SODIUM 250 MILLIGRAM(S): 500 TABLET, DELAYED RELEASE ORAL at 22:51

## 2021-08-28 RX ADMIN — ACETAZOLAMIDE 500 MILLIGRAM(S): 250 TABLET ORAL at 17:24

## 2021-08-28 RX ADMIN — DABIGATRAN ETEXILATE MESYLATE 150 MILLIGRAM(S): 150 CAPSULE ORAL at 22:51

## 2021-08-28 RX ADMIN — Medication 50 GRAM(S): at 22:51

## 2021-08-28 NOTE — PROGRESS NOTE ADULT - ASSESSMENT
ASSESSMENT: 42 y/o right-handed Slovak-speaking M with PMH of cervical herniated discs/shoulder injury presented with sudden onset headache at 1am on 8/25 described as worse headache of his life, sudden in onset while sitting on his couch watching TV when he suddenly developed a 10/10 diffuse headache and blurry vision. CTA head/neck showed diffuse dural venous sinus thrombosis with clot involving the mid and posterior superior sagittal sinus, torcula, bilateral transverse and right sigmoid sinuses, and right internal jugular vein.      Impression: Headache and vision changes secondary to extensive central venous sinus thromboses of unknown etiology, hypercoagulable states should be screened for .     NEURO: neurologically without acute change, sx control of headache,  Continue close monitoring for neurologic deterioration in setting of cerebral edema with mass effect and brain compression, EEG pending, normotension as toleratd, statin based on outpatient risk stratification if indicated, MR imaging noted.  Physical therapy/OT: home with outpatient therapy.     ANTITHROMBOTIC THERAPY: dabigatran, timeline based on clinical and radiological improvement at follow up based on further workup.     PULMONARY:   protecting airway, saturating well     CARDIOVASCULAR:  TTE:  ef 60%, cardiac monitoring with no acute events noted                              SBP goal: normotension overall, 110-160mmhg     GASTROINTESTINAL:  dysphagia screen passed, tolerating diet , GI consulted Dr Grant : : Appearance of mild inflammation of the right colon/hepatic flexure may be due to motion. The possibility of colitis cannot be excluded. Additional colonic diverticulosis, without diverticulitis. Borderline mild distention of the appendiceal tip without surrounding inflammation or secondary sign of acute appendicitis. Correlate for any right-sided abdominal pain. Gallbladder sludge, no acute si/sx of active infection or GI symptoms, will continue to monitor.      Diet: regular     RENAL: BUN/Cr without acute change, good urine output, maintain adequate hydration       Na Goal: Greater than 135     Garrett: n     HEMATOLOGY: H/H without acute change, maintain adequate hydraiton, Platelets 206, CT CAP with no evidence of malignancy , he should have all age and risk appropriate malignancy screenings , hypercoag panel. LE duplex with no evidence of DVT.      DVT ppx: Dabigatran      ID: afebrile, no leukocytosis     OTHER: condition and plan of care d/w patient, questions and concerns addressed.  MR imaging as per opthalmic eval follow up appreciated.     DISPOSITION: Home with outpatient therapy.       CORE MEASURES:        Admission NIHSS: 0     TPA: [] YES [x] NO      LDL/HDL: 125/37     Depression Screen: p     Statin Therapy: as noted      Dysphagia Screen: [x] PASS [] FAIL     Smoking [] YES [x] NO      Afib [] YES [x] NO     Stroke Education [x] YES [] NO    Obtain screening lower extremity venous ultrasound in patients who meet 1 or more of the following criteria as patient is high risk for DVT/PE on admission:   [] History of DVT/PE  []Hypercoagulable states (Factor V Leiden, Cancer, OCP, etc. )  []Prolonged immobility (hemiplegia/hemiparesis/post operative or any other extended immobilization)  [] Transferred from outside facility (Rehab or Long term care)  [] Age </= to 50 ASSESSMENT: 42 y/o right-handed Lithuanian-speaking M with PMH of cervical herniated discs/shoulder injury presented with sudden onset headache at 1am on 8/25 described as worse headache of his life, sudden in onset while sitting on his couch watching TV when he suddenly developed a 10/10 diffuse headache and blurry vision. CTA head/neck showed diffuse dural venous sinus thrombosis with clot involving the mid and posterior superior sagittal sinus, torcula, bilateral transverse and right sigmoid sinuses, and right internal jugular vein.      Impression: Headache and vision changes secondary to extensive central venous sinus thromboses of unknown etiology, hypercoagulable states should be screened for.     NEURO: Patient with extensive VST, with no deep component, currently neurosurgery states not interventional candidate.  Neurologically without acute change, sx control of headache,  Continue monitoring for neurologic deterioration in setting of cerebral edema with mass effect and brain compression, EEG done no seizures noted , normotension as tolerated statin based on outpatient risk stratification if indicated, MR imaging noted.  Physical therapy/OT: home with outpatient therapy. MRI with evidence of increased ICP, opthalmology to see to reassess for papilledema today, started Diamox 500mg BID.     ANTITHROMBOTIC THERAPY: dabigatran, timeline based on clinical and radiological improvement at follow up based on further workup.     PULMONARY:   protecting airway, saturating well     CARDIOVASCULAR:  TTE:  ef 60%, cardiac monitoring with no acute events noted                              SBP goal: normotension overall, 110-160mmhg     GASTROINTESTINAL:  dysphagia screen passed, tolerating diet , GI consulted Dr Grant :  Appearance of mild inflammation of the right colon/hepatic flexure may be due to motion. The possibility of colitis cannot be excluded. Additional colonic diverticulosis, without diverticulitis. Borderline mild distention of the appendiceal tip without surrounding inflammation or secondary sign of acute appendicitis. Correlate for any right-sided abdominal pain. Gallbladder sludge, no acute si/sx of active infection or GI symptoms, will continue to monitor.      Diet: regular     RENAL: BUN/Cr without acute change, good urine output, maintain adequate hydration       Na Goal: Greater than 135     Garrett: n     HEMATOLOGY: H/H without acute change, maintain adequate hydration Platelets 201, CT CAP with no evidence of malignancy , he should have all age and risk appropriate malignancy screenings , hyper coag panel. LE duplex with no evidence of DVT.      DVT ppx: Dabigatran      ID: afebrile, no leukocytosis     OTHER: condition and plan of care d/w patient and sister over the phone, questions and concerns addressed.      DISPOSITION: Home with outpatient therapy.     CORE MEASURES:        Admission NIHSS: 0     TPA: [] YES [x] NO      LDL/HDL: 125/37     Depression Screen: p     Statin Therapy: as noted      Dysphagia Screen: [x] PASS [] FAIL     Smoking [] YES [x] NO      Afib [] YES [x] NO     Stroke Education [x] YES [] NO    Obtain screening lower extremity venous ultrasound in patients who meet 1 or more of the following criteria as patient is high risk for DVT/PE on admission:   [] History of DVT/PE  []Hypercoagulable states (Factor V Leiden, Cancer, OCP, etc. )  []Prolonged immobility (hemiplegia/hemiparesis/post operative or any other extended immobilization)  [] Transferred from outside facility (Rehab or Long term care)  [] Age </= to 50

## 2021-08-28 NOTE — PROGRESS NOTE ADULT - ASSESSMENT
41M no sig PMHx, xfer [facility] for stroke tx of extensive CVST, pw sudden onset WHOL AM 8/25 a/w blurry vision and 1x vomiting. Continues to have severe frontal and retroorbital HA, and R-sided neck pain. CTH non con w/ hyperdense SSS and b/l TS, no ICH. CTA/V w/ diffuse VST involving posterior 2/3 SSS, b/l TS, R sigmoid sinus into IJ, patent deep venous system.  8/28:   - ADM Stroke, q2h neuro checks  - hep gtt, goal 60-90 per neuro, last PTT supratx 86  - MRI/MRV +C done, c/w CTV w/ SS sinus/TS and R IJ thrombus, CTH for any acute change in exam  - AEDs per neurology  - ophtho saw, no papilledema  - Repeat MRI/V Monday to see if clearing thrombus, otherwise may need angio

## 2021-08-28 NOTE — PROGRESS NOTE ADULT - SUBJECTIVE AND OBJECTIVE BOX
[facility] DEPARTMENT OF OPHTHALMOLOGY  ------------------------------------------------------------------------------    Interval History: No acute events overnight. Pt complaining of continued headache. Ophthalmology asked to re-evaluate for papilledema. Today patient endorses pain with eye movement. Pt denying blurred vision, eye pain, flashes, floaters, FBS, erythema, or discharge.     MEDICATIONS  (STANDING):  acetaZOLAMIDE    Tablet 500 milliGRAM(s) Oral two times a day  artificial  tears Solution 1 Drop(s) Both EYES daily  dabigatran 150 milliGRAM(s) Oral two times a day  sodium chloride 0.9%. 1000 milliLiter(s) (100 mL/Hr) IV Continuous <Continuous>    MEDICATIONS  (PRN):  metoclopramide Injectable 10 milliGRAM(s) IV Push every 6 hours PRN nausea/vomiting  ondansetron Injectable 4 milliGRAM(s) IV Push every 4 hours PRN Nausea and/or Vomiting      VITALS: T(C): 36.8 (08-28-21 @ 15:36)  T(F): 98.3 (08-28-21 @ 15:36), Max: 98.3 (08-27-21 @ 23:37)  HR: 61 (08-28-21 @ 15:36) (57 - 88)  BP: 116/76 (08-28-21 @ 15:36) (116/76 - 150/87)  RR:  (18 - 18)  SpO2:  (97% - 99%)  Wt(kg): --  General: AAO x 3, appropriate mood and affect    Ophthalmology Exam:  Visual acuity (sc): 20/20 OU  Pupils: PERRL OU, no APD  Ttono: 21 OD, 18 OS  Extraocular movements (EOMs): Full OU, no pain, no diplopia  Confrontational Visual Field (CVF): Full OU    Pen Light Exam (PLE)  External: Flat OU  Lids/Lashes/Lacrimal Ducts: Flat OU    Sclera/Conjunctiva: W+Q OU  Cornea: Cl OU  Anterior Chamber: D+F OU    Iris: Flat OU  Lens: Cl OU    Fundus Exam: dilated with 1% tropicamide and 2.5% phenylephrine  Approval obtained from primary team for dilation  Patient aware that pupils can remained dilated for at least 4-6 hours  Exam performed with 20D lens    Vitreous: wnl OU  Disc, cup/disc: sharp and pink with clear disc margins and no obscuration of vessels, no disc elevation or edema, 0.4 OU  Macula: wnl OU  Vessels: wnl OU  Periphery: wnl OU      Imaging:      EXAM:  MR VENOGRAM BRAIN WAW IC                          EXAM:  MR ORBITS ONLY WAWI                          EXAM:  MR BRAIN WAW IC                            PROCEDURE DATE:  08/27/2021      MRI orbits:    The visualized bony walls of bilateral orbits are grossly normal.    The globes are normal in configuration and the lenses are normally situated. There is abnormal signal intensity in either eye, optic nerves, extraocular muscles and lacrimal glands. There is evidence of bilateral prominence of optic nerve CSF sheaths bilaterally measuring approximately 2.4 mm on each side. In the left globe there is cupping of the optic nerve into the posterior aspect of the globe, probably representing signs of increased intracranial pressure.  No mass is seen in either orbit in the orbital apices are normal. The visualized optic chiasm and suprasellar region is within normal limits. No gross abnormalities seen in either cavernous sinus or in the sella turcica region.      IMPRESSION:    There is no acute infarction, intracranial hemorrhage, midline shift or herniation. There is no abnormal extra axial fluid collection or hydrocephalus. There is no pathologic enhancement intracranially.    Findings suggestive of increased intracranial pressure as described.    The MR examination of the orbits is unremarkable.    Stable in extent thrombosis of the superior sagittal sinus extending into the confluence of the sinuses bilateral transverse sinuses, right sigmoid and right jugular bulb.    There is no thrombosis of the deep venous sinuses.      Assessment and Recommendations:  41y male w/ pmhx of cervical herniated discs consulted for rule out papilledema, found to have multiple venous sinus thromboses on imaging. VA 20/20 OU, no APD, IOP , CVF full, color plates full, EOMI, Anterior exam wnl and DFE reveals optic nerves 0.4 OU sharp and pink with clear disc margins and no obscuration of vessels, no disc elevation or edema. Exam does not show papilledema. Exam on 8/25 also negative for papilledema.     #Venous sinus thromboses  - multiple venous sinus thromboses found in superior sagittal sinus, dural venous sinus thrombosis, torcula, bilateral transverse and right sigmoid sinuses and right internal jugular vein  - COVID negative  - anticoagulation as per primary team  - appreciate neurology recs  - coagulopathy workup as per primary team  - MRI orbits evidence of increased intracranial pressure  - Lack of papilledema does not rule out increased intracranial pressure. Recommend further neurology management.       Renu FISH Rai, MD  PGY-2, Ophthalmology  Pager: [phone]    Available on [employer] Teams [facility] DEPARTMENT OF OPHTHALMOLOGY  ------------------------------------------------------------------------------    Interval History: No acute events overnight. Pt complaining of continued headache. Ophthalmology asked to re-evaluate for papilledema. Today patient endorses pain with eye movement. Pt denying blurred vision, eye pain, flashes, floaters, FBS, erythema, or discharge.     MEDICATIONS  (STANDING):  acetaZOLAMIDE    Tablet 500 milliGRAM(s) Oral two times a day  artificial  tears Solution 1 Drop(s) Both EYES daily  dabigatran 150 milliGRAM(s) Oral two times a day  sodium chloride 0.9%. 1000 milliLiter(s) (100 mL/Hr) IV Continuous <Continuous>    MEDICATIONS  (PRN):  metoclopramide Injectable 10 milliGRAM(s) IV Push every 6 hours PRN nausea/vomiting  ondansetron Injectable 4 milliGRAM(s) IV Push every 4 hours PRN Nausea and/or Vomiting      VITALS: T(C): 36.8 (08-28-21 @ 15:36)  T(F): 98.3 (08-28-21 @ 15:36), Max: 98.3 (08-27-21 @ 23:37)  HR: 61 (08-28-21 @ 15:36) (57 - 88)  BP: 116/76 (08-28-21 @ 15:36) (116/76 - 150/87)  RR:  (18 - 18)  SpO2:  (97% - 99%)  Wt(kg): --  General: AAO x 3, appropriate mood and affect    Ophthalmology Exam:  Visual acuity (sc): 20/20 OU  Pupils: PERRL OU, no APD  Ttono: 21 OD, 18 OS  Extraocular movements (EOMs): Full OU, no pain, no diplopia  Confrontational Visual Field (CVF): Full OU    Pen Light Exam (PLE)  External: Flat OU  Lids/Lashes/Lacrimal Ducts: Flat OU    Sclera/Conjunctiva: W+Q OU  Cornea: Cl OU  Anterior Chamber: D+F OU    Iris: Flat OU  Lens: Cl OU    Fundus Exam: dilated with 1% tropicamide and 2.5% phenylephrine  Approval obtained from primary team for dilation  Patient aware that pupils can remained dilated for at least 4-6 hours  Exam performed with 20D lens    Vitreous: wnl OU  Disc, cup/disc: sharp and pink with clear disc margins and no obscuration of vessels, no disc elevation or edema, 0.4 OU  Macula: wnl OU  Vessels: wnl OU  Periphery: wnl OU      Imaging:      EXAM:  MR VENOGRAM BRAIN WAW IC                          EXAM:  MR ORBITS ONLY WAWI                          EXAM:  MR BRAIN WAW IC                            PROCEDURE DATE:  08/27/2021      MRI orbits:    The visualized bony walls of bilateral orbits are grossly normal.    The globes are normal in configuration and the lenses are normally situated. There is abnormal signal intensity in either eye, optic nerves, extraocular muscles and lacrimal glands. There is evidence of bilateral prominence of optic nerve CSF sheaths bilaterally measuring approximately 2.4 mm on each side. In the left globe there is cupping of the optic nerve into the posterior aspect of the globe, probably representing signs of increased intracranial pressure.  No mass is seen in either orbit in the orbital apices are normal. The visualized optic chiasm and suprasellar region is within normal limits. No gross abnormalities seen in either cavernous sinus or in the sella turcica region.      IMPRESSION:    There is no acute infarction, intracranial hemorrhage, midline shift or herniation. There is no abnormal extra axial fluid collection or hydrocephalus. There is no pathologic enhancement intracranially.    Findings suggestive of increased intracranial pressure as described.    The MR examination of the orbits is unremarkable.    Stable in extent thrombosis of the superior sagittal sinus extending into the confluence of the sinuses bilateral transverse sinuses, right sigmoid and right jugular bulb.    There is no thrombosis of the deep venous sinuses.      Assessment and Recommendations:  41y male w/ pmhx of cervical herniated discs consulted for rule out papilledema, found to have multiple venous sinus thromboses on imaging. VA 20/20 OU, no APD, IOP , CVF full, color plates full, EOMI, Anterior exam wnl and DFE reveals optic nerves 0.4 OU sharp and pink with clear disc margins and no obscuration of vessels, no disc elevation or edema. Exam does not show papilledema. Exam on 8/25 also negative for papilledema.     #Venous sinus thromboses  - multiple venous sinus thromboses found in superior sagittal sinus, dural venous sinus thrombosis, torcula, bilateral transverse and right sigmoid sinuses and right internal jugular vein  - COVID negative  - anticoagulation as per primary team  - appreciate neurology recs  - coagulopathy workup as per primary team  - MRI orbits evidence of increased intracranial pressure  - Lack of papilledema does not rule out increased intracranial pressure. Recommend further neurology/neurosurgery management.       Renu FISH Rai, MD  PGY-2, Ophthalmology  Pager: [phone]    Available on [employer] Teams [facility] DEPARTMENT OF OPHTHALMOLOGY  ------------------------------------------------------------------------------    Interval History: No acute events overnight. Pt complaining of continued headache. Ophthalmology asked to re-evaluate for papilledema. Today patient endorses pain with eye movement. Pt denying blurred vision, eye pain, flashes, floaters, FBS, erythema, or discharge.     MEDICATIONS  (STANDING):  acetaZOLAMIDE    Tablet 500 milliGRAM(s) Oral two times a day  artificial  tears Solution 1 Drop(s) Both EYES daily  dabigatran 150 milliGRAM(s) Oral two times a day  sodium chloride 0.9%. 1000 milliLiter(s) (100 mL/Hr) IV Continuous <Continuous>    MEDICATIONS  (PRN):  metoclopramide Injectable 10 milliGRAM(s) IV Push every 6 hours PRN nausea/vomiting  ondansetron Injectable 4 milliGRAM(s) IV Push every 4 hours PRN Nausea and/or Vomiting      VITALS: T(C): 36.8 (08-28-21 @ 15:36)  T(F): 98.3 (08-28-21 @ 15:36), Max: 98.3 (08-27-21 @ 23:37)  HR: 61 (08-28-21 @ 15:36) (57 - 88)  BP: 116/76 (08-28-21 @ 15:36) (116/76 - 150/87)  RR:  (18 - 18)  SpO2:  (97% - 99%)  Wt(kg): --  General: AAO x 3, appropriate mood and affect    Ophthalmology Exam:  Visual acuity (sc): 20/20 OU  Pupils: PERRL OU, no APD  Ttono: 21 OD, 18 OS  Extraocular movements (EOMs): Full OU, no pain, no diplopia  Confrontational Visual Field (CVF): Full OU    Pen Light Exam (PLE)  External: Flat OU  Lids/Lashes/Lacrimal Ducts: Flat OU    Sclera/Conjunctiva: W+Q OU  Cornea: Cl OU  Anterior Chamber: D+F OU    Iris: Flat OU  Lens: Cl OU    Fundus Exam: dilated with 1% tropicamide and 2.5% phenylephrine  Approval obtained from primary team for dilation  Patient aware that pupils can remained dilated for at least 4-6 hours  Exam performed with 20D lens    Vitreous: wnl OU  Disc, cup/disc: sharp and pink with clear disc margins and no obscuration of vessels, no disc elevation or edema, 0.4 OU  Macula: wnl OU  Vessels: wnl OU  Periphery: wnl OU      Imaging:      EXAM:  MR VENOGRAM BRAIN WAW IC                          EXAM:  MR ORBITS ONLY WAWI                          EXAM:  MR BRAIN WAW IC                            PROCEDURE DATE:  08/27/2021      MRI orbits:    The visualized bony walls of bilateral orbits are grossly normal.    The globes are normal in configuration and the lenses are normally situated. There is abnormal signal intensity in either eye, optic nerves, extraocular muscles and lacrimal glands. There is evidence of bilateral prominence of optic nerve CSF sheaths bilaterally measuring approximately 2.4 mm on each side. In the left globe there is cupping of the optic nerve into the posterior aspect of the globe, probably representing signs of increased intracranial pressure.  No mass is seen in either orbit in the orbital apices are normal. The visualized optic chiasm and suprasellar region is within normal limits. No gross abnormalities seen in either cavernous sinus or in the sella turcica region.      IMPRESSION:    There is no acute infarction, intracranial hemorrhage, midline shift or herniation. There is no abnormal extra axial fluid collection or hydrocephalus. There is no pathologic enhancement intracranially.    Findings suggestive of increased intracranial pressure as described.    The MR examination of the orbits is unremarkable.    Stable in extent thrombosis of the superior sagittal sinus extending into the confluence of the sinuses bilateral transverse sinuses, right sigmoid and right jugular bulb.    There is no thrombosis of the deep venous sinuses.      Assessment and Recommendations:  41y male w/ pmhx of cervical herniated discs consulted for rule out papilledema, found to have multiple venous sinus thromboses on imaging. VA 20/20 OU, no APD, IOP , CVF full, color plates full, EOMI, Anterior exam wnl and DFE reveals optic nerves 0.4 OU sharp and pink with clear disc margins and no obscuration of vessels, no disc elevation or edema. Exam does not show papilledema. Exam on 8/25 also negative for papilledema.     #Venous sinus thromboses  - multiple venous sinus thromboses found in superior sagittal sinus, dural venous sinus thrombosis, torcula, bilateral transverse and right sigmoid sinuses and right internal jugular vein  - COVID negative  - anticoagulation as per primary team  - appreciate neurology recs  - coagulopathy workup as per primary team  - MRI orbits evidence of increased intracranial pressure  - Lack of papilledema does not rule out increased intracranial pressure. Recommend further neurology/neurosurgery management.       Case discussed with Dr. Richards, chief resident.     Renu FISH Rai, MD  PGY-2, Ophthalmology  Pager: [phone]    Available on [employer] Teams

## 2021-08-28 NOTE — PROGRESS NOTE ADULT - ASSESSMENT
1. Abnormal CT showing disetended and folded appendix. Pt denies abd pain, nontender on exam. Suspect normal variant.  -serial abdominla exams, diet as tolerated    2. Venous sinus thrombosis  -per neurology NSX
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
## 2021-08-28 NOTE — PROGRESS NOTE ADULT - SUBJECTIVE AND OBJECTIVE BOX
Medicine Progress Note    Patient is a 41y old  Male who presents with a chief complaint of cvst (28 Aug 2021 19:01)      SUBJECTIVE / OVERNIGHT EVENTS:  no abd pain  ADDITIONAL REVIEW OF SYSTEMS:    MEDICATIONS  (STANDING):  acetaZOLAMIDE    Tablet 500 milliGRAM(s) Oral two times a day  artificial  tears Solution 1 Drop(s) Both EYES daily  dabigatran 150 milliGRAM(s) Oral two times a day  sodium chloride 0.9%. 1000 milliLiter(s) (100 mL/Hr) IV Continuous <Continuous>    MEDICATIONS  (PRN):  metoclopramide Injectable 10 milliGRAM(s) IV Push every 6 hours PRN nausea/vomiting  ondansetron Injectable 4 milliGRAM(s) IV Push every 4 hours PRN Nausea and/or Vomiting    CAPILLARY BLOOD GLUCOSE        I&O's Summary    28 Aug 2021 07:01  -  28 Aug 2021 19:58  --------------------------------------------------------  IN: 1720 mL / OUT: 0 mL / NET: 1720 mL        PHYSICAL EXAM:  Vital Signs Last 24 Hrs  T(C): 36.6 (28 Aug 2021 19:48), Max: 36.8 (27 Aug 2021 23:37)  T(F): 97.8 (28 Aug 2021 19:48), Max: 98.3 (27 Aug 2021 23:37)  HR: 63 (28 Aug 2021 19:48) (57 - 88)  BP: 118/75 (28 Aug 2021 19:48) (116/76 - 150/87)  BP(mean): --  RR: 18 (28 Aug 2021 19:48) (18 - 18)  SpO2: 98% (28 Aug 2021 19:48) (97% - 98%)  CONSTITUTIONAL: NAD, well-developed, well-groomed  ENMT: Moist oral mucosa, no pharyngeal injection or exudates; normal dentition  RESPIRATORY: Normal respiratory effort; lungs are clear to auscultation bilaterally  CARDIOVASCULAR: Regular rate and rhythm, normal S1 and S2, no murmur/rub/gallop; No lower extremity edema; Peripheral pulses are 2+ bilaterally  ABDOMEN: Nontender to palpation, normoactive bowel sounds, no rebound/guarding; No hepatosplenomegaly  PSYCH: A+O to person, place, and time; affect appropriate  NEUROLOGY: CN 2-12 are intact and symmetric; no gross sensory deficits   SKIN: No rashes; no palpable lesions    LABS:                        13.6   4.92  )-----------( 201      ( 28 Aug 2021 08:12 )             40.5     08-28    137  |  99  |  6<L>  ----------------------------<  122<H>  4.2   |  22  |  0.61    Ca    9.7      28 Aug 2021 08:12                COVID-19 PCR: NotDetec (25 Aug 2021 05:20)      RADIOLOGY & ADDITIONAL TESTS:  Imaging from Last 24 Hours:    Electrocardiogram/QTc Interval:    COORDINATION OF CARE:  Care Discussed with Consultants/Other Providers:

## 2021-08-28 NOTE — EEG REPORT - NS EEG TEXT BOX
REPORT OF ROUTINE EEG WITH VIDEO  [facility]: [address], [address] [place] [address], Phone: [phone] [facility]: [address] [place] [address], [place] [address], Phone: [phone] Office: [address] [place] [address], [place] [address], [place] [address], Phone: [phone]  Patient Name: Reese Thacker   Age: 41 year : 1980 MRN #: [id number], Location: [address] [place] [address] Referring Physician: -  EEG #: [id number] Study Date: 2021   Start Time: 5:54:24 PM    		  Technical Information:					 On Instrument: - Placement and Labeling of Electrodes: The EEG was performed utilizing 20 channels referential EEG connections (coronal over temporal over parasagittal montage) using all standard 10-20 electrode placements with EKG.  Recording was at a sampling rate of 256 samples per second per channel.  Time synchronized digital video recording was done simultaneously with EEG recording.  A low light infrared camera was used for low light recording.  Ralph and seizure detection algorithms were utilized. CSA Technical Component: Quantitative EEG analysis using a separate Compressed Spectral Array (CSA) software package was conducted in real-time and run at bedside after set up by the technician, digitally displaying the power of electrographic frequencies included in the 1-30Hz band using a graded color map.  This data was reviewed and interpreted independently, and is reported in a separate section below.  History:  Routine study performed bedside COR: Awake, drowsy No HV due to COVID Protocol No PHOTIC due to headaches 40 Y/O Male P/W: Seizure like activity H/O: Headaches, Dural venous sinus thrombosis  Medication Zofran Benadryl Percocet  Study Interpretation:  FINDINGS:  The background was continuous, spontaneously variable and reactive.  During wakefulness, the posteriorly dominant rhythm consisted of symmetric, well modulated 9.5 Hz activity, with an amplitude to 30 uV, that attenuated to eye opening.  Low amplitude central beta was noted in wakefulness.  Background Slowing: Generalized slowing: Theta/delta slowing. Focal slowing: none was present.  Sleep Background: Drowsiness was characterized by fragmentation, attenuation, and slowing of the background activity.   Stage II sleep transients were not recorded.  Non-epileptiform activity: None.  Epileptiform Activity:  No epileptiform discharges were present.  Events: No clinical events were recorded. No seizures were recorded.  Activation Procedures:  -Hyperventilation was not performed.   -Photic stimulation was not performed.  Artifacts: Intermittent myogenic and movement artifacts were noted.  ECG: The heart rate on single channel ECG was recorded.  EEG Classification / Summary:  Abnormal EEG in the awake/ drowsy states. -Mild generalized slowing  Clinical Impression:  Mild nonspecific diffuse or multifocal cerebral dysfunction.  No epileptiform pattern or seizure seen.  Preliminary Fellow Report, final report pending attending review  Odin Best MD Epilepsy Fellow  Reading Room: [phone] On Call Service After Hours: [phone]    REPORT OF ROUTINE EEG WITH VIDEO  [facility]: [address] [place] [address], Phone: [phone] [facility]: [address] [place] [address], [place] [address], Phone: [phone] Office: [address] [place] [address], [place] [address], [place] [address], Phone: [phone]  Patient Name: Reese Thacker   Age: 41 year : 1980 MRN #: [id number], Location: [address] [place] [address] Referring Physician: -  EEG #: [id number] Study Date: 2021   Start Time: 5:54:24 PM    		  Technical Information:					 On Instrument: - Placement and Labeling of Electrodes: The EEG was performed utilizing 20 channels referential EEG connections (coronal over temporal over parasagittal montage) using all standard 10-20 electrode placements with EKG.  Recording was at a sampling rate of 256 samples per second per channel.  Time synchronized digital video recording was done simultaneously with EEG recording.  A low light infrared camera was used for low light recording.  Ralph and seizure detection algorithms were utilized. CSA Technical Component: Quantitative EEG analysis using a separate Compressed Spectral Array (CSA) software package was conducted in real-time and run at bedside after set up by the technician, digitally displaying the power of electrographic frequencies included in the 1-30Hz band using a graded color map.  This data was reviewed and interpreted independently, and is reported in a separate section below.  History:  Routine study performed bedside COR: Awake, drowsy No HV due to COVID Protocol No PHOTIC due to headaches 40 Y/O Male P/W: Seizure like activity H/O: Headaches, Dural venous sinus thrombosis  Medication Zofran Benadryl Percocet  Study Interpretation:  FINDINGS:  The background was continuous, spontaneously variable and reactive.  During wakefulness, the posteriorly dominant rhythm consisted of symmetric, well modulated 9.5 Hz activity, with an amplitude to 30 uV, that attenuated to eye opening.  Low amplitude central beta was noted in wakefulness.  Background Slowing: Generalized slowing: Theta/delta slowing. Focal slowing: none was present.  Sleep Background: Drowsiness was characterized by fragmentation, attenuation, and slowing of the background activity.   Stage II sleep transients were not recorded.  Non-epileptiform activity: None.  Epileptiform Activity:  No epileptiform discharges were present.  Events: No clinical events were recorded. No seizures were recorded.  Activation Procedures:  -Hyperventilation was not performed.   -Photic stimulation was not performed.  Artifacts: Intermittent myogenic and movement artifacts were noted.  ECG: The heart rate on single channel ECG was recorded.  EEG Classification / Summary:  Abnormal EEG in the awake/ drowsy states. -Mild generalized slowing  Clinical Impression:  Mild nonspecific diffuse or multifocal cerebral dysfunction.  No epileptiform pattern or seizure seen.  Odin Best MD Epilepsy Fellow  Reading Room: [phone] On Call Service After Hours: [phone]

## 2021-08-28 NOTE — PROGRESS NOTE ADULT - SUBJECTIVE AND OBJECTIVE BOX
Patient seen and examined at bedside.    --Anticoagulation--  dabigatran 150 milliGRAM(s) Oral two times a day    T(C): 36.7 (08-28-21 @ 08:00), Max: 36.9 (08-27-21 @ 15:38)  HR: 88 (08-28-21 @ 08:00) (57 - 88)  BP: 150/87 (08-28-21 @ 08:00) (123/82 - 150/87)  RR: 18 (08-28-21 @ 08:00) (18 - 18)  SpO2: 97% (08-28-21 @ 08:00) (97% - 99%)  Wt(kg): --    Exam: AOx3, VFF (subj blurry vision), EOMI, no facial, RAMIREZ 5/5, no drift, SILT.     Imaging: MRI 8.27 c/w CTV w/ SS sinus/TS and R IJ thrombus                          13.6   4.92  )-----------( 201      ( 28 Aug 2021 08:12 )             40.5     08-28    137  |  99  |  6<L>  ----------------------------<  122<H>  4.2   |  22  |  0.61    Ca    9.7      28 Aug 2021 08:12

## 2021-08-29 RX ORDER — ACETAMINOPHEN 500 MG
1000 TABLET ORAL EVERY 8 HOURS
Refills: 0 | Status: COMPLETED | OUTPATIENT
Start: 2021-08-29 | End: 2021-08-30

## 2021-08-29 RX ORDER — ACETAMINOPHEN 500 MG
1000 TABLET ORAL ONCE
Refills: 0 | Status: COMPLETED | OUTPATIENT
Start: 2021-08-29 | End: 2021-08-29

## 2021-08-29 RX ADMIN — ACETAZOLAMIDE 500 MILLIGRAM(S): 250 TABLET ORAL at 05:36

## 2021-08-29 RX ADMIN — ACETAZOLAMIDE 500 MILLIGRAM(S): 250 TABLET ORAL at 17:10

## 2021-08-29 RX ADMIN — DABIGATRAN ETEXILATE MESYLATE 150 MILLIGRAM(S): 150 CAPSULE ORAL at 12:05

## 2021-08-29 RX ADMIN — Medication 1000 MILLIGRAM(S): at 06:00

## 2021-08-29 RX ADMIN — Medication 400 MILLIGRAM(S): at 17:18

## 2021-08-29 RX ADMIN — Medication 400 MILLIGRAM(S): at 05:39

## 2021-08-29 RX ADMIN — DABIGATRAN ETEXILATE MESYLATE 150 MILLIGRAM(S): 150 CAPSULE ORAL at 23:43

## 2021-08-29 RX ADMIN — Medication 1000 MILLIGRAM(S): at 18:11
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
## 2021-08-29 NOTE — PROGRESS NOTE ADULT - ATTENDING COMMENTS
Henry James MD  Vascular Neurology
Henry James MD  Vascular Neurology
continue heparin drip, MRI pending
Henry James MD  Vascular Neurology

## 2021-08-29 NOTE — PROGRESS NOTE ADULT - SUBJECTIVE AND OBJECTIVE BOX
Patient seen and examined at bedside.    --Anticoagulation--  dabigatran 150 milliGRAM(s) Oral two times a day    Exam: AOx3, VFF (subj blurry vision), EOMI, no facial, RAMIREZ 5/5, no drift, SILT.     Imaging: MRI 8.27 c/w CTV w/ SS sinus/TS and R IJ thrombus    ICU Vital Signs Last 24 Hrs  T(C): 36.6 (29 Aug 2021 08:15), Max: 36.8 (28 Aug 2021 15:36)  T(F): 97.9 (29 Aug 2021 08:15), Max: 98.3 (28 Aug 2021 15:36)  HR: 92 (29 Aug 2021 08:15) (61 - 92)  BP: 110/71 (29 Aug 2021 08:15) (110/71 - 132/84)  BP(mean): --  ABP: --  ABP(mean): --  RR: 18 (29 Aug 2021 08:15) (18 - 19)  SpO2: 98% (29 Aug 2021 08:15) (98% - 98%)  08-28    137  |  99  |  6<L>  ----------------------------<  122<H>  4.2   |  22  |  0.61    Ca    9.7      28 Aug 2021 08:12                          13.6   4.92  )-----------( 201      ( 28 Aug 2021 08:12 )             40.5
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
## 2021-08-29 NOTE — PROGRESS NOTE ADULT - ASSESSMENT
41M no sig PMHx, xfer [facility] for stroke tx of extensive CVST, pw sudden onset WHOL AM 8/25 a/w blurry vision and 1x vomiting. Continues to have severe frontal and retroorbital HA, and R-sided neck pain. CTH non con w/ hyperdense SSS and b/l TS, no ICH. CTA/V w/ diffuse VST involving posterior 2/3 SSS, b/l TS, R sigmoid sinus into IJ, patent deep venous system.  8/28:   - ADM Stroke, q2h neuro checks  - hep gtt, goal 60-90 per neuro  - MRI/MRV +C done, c/w CTV w/ SS sinus/TS and R IJ thrombus, CTH for any acute change in exam  - AEDs per neurology  - ophtho saw, no papilledema  - Repeat MRI/V Monday to see if clearing thrombus, otherwise may need angio

## 2021-08-30 LAB
ANION GAP SERPL CALC-SCNC: 12 MMOL/L — SIGNIFICANT CHANGE UP (ref 5–17)
BUN SERPL-MCNC: 10 MG/DL — SIGNIFICANT CHANGE UP (ref 7–23)
CALCIUM SERPL-MCNC: 9.1 MG/DL — SIGNIFICANT CHANGE UP (ref 8.4–10.5)
CHLORIDE SERPL-SCNC: 109 MMOL/L — HIGH (ref 96–108)
CO2 SERPL-SCNC: 14 MMOL/L — LOW (ref 22–31)
COVID-19 NUCLEOCAPSID GAM AB INTERP: NEGATIVE — SIGNIFICANT CHANGE UP
COVID-19 NUCLEOCAPSID TOTAL GAM ANTIBODY RESULT: 0.08 INDEX — SIGNIFICANT CHANGE UP
COVID-19 SPIKE DOMAIN AB INTERP: NEGATIVE — SIGNIFICANT CHANGE UP
COVID-19 SPIKE DOMAIN ANTIBODY RESULT: 0.4 U/ML — SIGNIFICANT CHANGE UP
CREAT SERPL-MCNC: 0.88 MG/DL — SIGNIFICANT CHANGE UP (ref 0.5–1.3)
GLUCOSE SERPL-MCNC: 94 MG/DL — SIGNIFICANT CHANGE UP (ref 70–99)
HCT VFR BLD CALC: 44.1 % — SIGNIFICANT CHANGE UP (ref 39–50)
HGB BLD-MCNC: 14.3 G/DL — SIGNIFICANT CHANGE UP (ref 13–17)
MAGNESIUM SERPL-MCNC: 2.2 MG/DL — SIGNIFICANT CHANGE UP (ref 1.6–2.6)
MCHC RBC-ENTMCNC: 29 PG — SIGNIFICANT CHANGE UP (ref 27–34)
MCHC RBC-ENTMCNC: 32.4 GM/DL — SIGNIFICANT CHANGE UP (ref 32–36)
MCV RBC AUTO: 89.5 FL — SIGNIFICANT CHANGE UP (ref 80–100)
NRBC # BLD: 0 /100 WBCS — SIGNIFICANT CHANGE UP (ref 0–0)
PHOSPHATE SERPL-MCNC: 3.2 MG/DL — SIGNIFICANT CHANGE UP (ref 2.5–4.5)
PLATELET # BLD AUTO: 200 K/UL — SIGNIFICANT CHANGE UP (ref 150–400)
POTASSIUM SERPL-MCNC: 3.9 MMOL/L — SIGNIFICANT CHANGE UP (ref 3.5–5.3)
POTASSIUM SERPL-SCNC: 3.9 MMOL/L — SIGNIFICANT CHANGE UP (ref 3.5–5.3)
RBC # BLD: 4.93 M/UL — SIGNIFICANT CHANGE UP (ref 4.2–5.8)
RBC # FLD: 12.2 % — SIGNIFICANT CHANGE UP (ref 10.3–14.5)
SARS-COV-2 IGG+IGM SERPL QL IA: 0.08 INDEX — SIGNIFICANT CHANGE UP
SARS-COV-2 IGG+IGM SERPL QL IA: 0.4 U/ML — SIGNIFICANT CHANGE UP
SARS-COV-2 IGG+IGM SERPL QL IA: NEGATIVE — SIGNIFICANT CHANGE UP
SARS-COV-2 IGG+IGM SERPL QL IA: NEGATIVE — SIGNIFICANT CHANGE UP
SODIUM SERPL-SCNC: 135 MMOL/L — SIGNIFICANT CHANGE UP (ref 135–145)
WBC # BLD: 5.64 K/UL — SIGNIFICANT CHANGE UP (ref 3.8–10.5)
WBC # FLD AUTO: 5.64 K/UL — SIGNIFICANT CHANGE UP (ref 3.8–10.5)

## 2021-08-30 RX ORDER — ACETAMINOPHEN 500 MG
1000 TABLET ORAL ONCE
Refills: 0 | Status: COMPLETED | OUTPATIENT
Start: 2021-08-30 | End: 2021-08-30

## 2021-08-30 RX ADMIN — Medication 1 DROP(S): at 11:29

## 2021-08-30 RX ADMIN — ACETAZOLAMIDE 500 MILLIGRAM(S): 250 TABLET ORAL at 17:28

## 2021-08-30 RX ADMIN — Medication 1000 MILLIGRAM(S): at 14:03

## 2021-08-30 RX ADMIN — Medication 1000 MILLIGRAM(S): at 20:34

## 2021-08-30 RX ADMIN — Medication 400 MILLIGRAM(S): at 20:04

## 2021-08-30 RX ADMIN — Medication 1000 MILLIGRAM(S): at 06:40

## 2021-08-30 RX ADMIN — DABIGATRAN ETEXILATE MESYLATE 150 MILLIGRAM(S): 150 CAPSULE ORAL at 13:17

## 2021-08-30 RX ADMIN — Medication 400 MILLIGRAM(S): at 05:42

## 2021-08-30 RX ADMIN — SODIUM CHLORIDE 100 MILLILITER(S): 9 INJECTION INTRAMUSCULAR; INTRAVENOUS; SUBCUTANEOUS at 17:28

## 2021-08-30 RX ADMIN — DABIGATRAN ETEXILATE MESYLATE 150 MILLIGRAM(S): 150 CAPSULE ORAL at 23:35

## 2021-08-30 RX ADMIN — ACETAZOLAMIDE 500 MILLIGRAM(S): 250 TABLET ORAL at 05:36

## 2021-08-30 RX ADMIN — Medication 400 MILLIGRAM(S): at 13:35

## 2021-08-30 NOTE — PROGRESS NOTE ADULT - SUBJECTIVE AND OBJECTIVE BOX
Patient seen and examined at bedside.    --Anticoagulation--  dabigatran 150 milliGRAM(s) Oral two times a day    T(C): 36.7 (08-30-21 @ 08:26), Max: 36.7 (08-29-21 @ 20:23)  HR: 96 (08-30-21 @ 08:26) (67 - 96)  BP: 104/69 (08-30-21 @ 08:26) (101/64 - 113/69)  RR: 17 (08-30-21 @ 08:26) (17 - 18)  SpO2: 98% (08-30-21 @ 08:26) (97% - 98%)  Wt(kg): --    Exam: AOx3, VFF (subj blurry vision), EOMI, no facial, RAMIREZ 5/5, no drift, SILT.   Georgian speaking    .  LABS:                         14.3   5.64  )-----------( 200      ( 30 Aug 2021 07:02 )             44.1     08-30    135  |  109<H>  |  10  ----------------------------<  94  3.9   |  14<L>  |  0.88    Ca    9.1      30 Aug 2021 07:02  Phos  3.2     08-30  Mg     2.2     08-30                RADIOLOGY, EKG & ADDITIONAL TESTS: Reviewed.
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
## 2021-08-30 NOTE — PROGRESS NOTE ADULT - ASSESSMENT
41M no sig PMHx, xfer [facility] for stroke tx of extensive CVST, pw sudden onset WHOL AM 8/25 a/w blurry vision and 1x vomiting. Continues to have severe frontal and retroorbital HA, and R-sided neck pain. CTH non con w/ hyperdense SSS and b/l TS, no ICH. CTA/V w/ diffuse VST involving posterior 2/3 SSS, b/l TS, R sigmoid sinus into IJ, patent deep venous system.  8/28:   - ADM Stroke, q2h neuro checks  - hep gtt, goal 60-90 per neuro, last PTT supratx 86  - MRI/MRV +C done, c/w CTV w/ SS sinus/TS and R IJ thrombus, CTH for any acute change in exam  - AEDs per neurology  - ophtho saw, started on diamox for c/f papilledema   - Repeat MRI/V today to see if clearing thrombus, otherwise may need angio

## 2021-08-31 PROCEDURE — 70553 MRI BRAIN STEM W/O & W/DYE: CPT | Mod: 26

## 2021-08-31 RX ORDER — ACETAMINOPHEN 500 MG
650 TABLET ORAL EVERY 6 HOURS
Refills: 0 | Status: DISCONTINUED | OUTPATIENT
Start: 2021-08-31 | End: 2021-09-01

## 2021-08-31 RX ORDER — ACETAMINOPHEN 500 MG
1000 TABLET ORAL ONCE
Refills: 0 | Status: COMPLETED | OUTPATIENT
Start: 2021-08-31 | End: 2021-08-31

## 2021-08-31 RX ADMIN — Medication 400 MILLIGRAM(S): at 09:46

## 2021-08-31 RX ADMIN — DABIGATRAN ETEXILATE MESYLATE 150 MILLIGRAM(S): 150 CAPSULE ORAL at 22:33

## 2021-08-31 RX ADMIN — SODIUM CHLORIDE 100 MILLILITER(S): 9 INJECTION INTRAMUSCULAR; INTRAVENOUS; SUBCUTANEOUS at 17:11

## 2021-08-31 RX ADMIN — ACETAZOLAMIDE 500 MILLIGRAM(S): 250 TABLET ORAL at 05:26

## 2021-08-31 RX ADMIN — Medication 650 MILLIGRAM(S): at 16:40

## 2021-08-31 RX ADMIN — Medication 650 MILLIGRAM(S): at 22:35

## 2021-08-31 RX ADMIN — Medication 1000 MILLIGRAM(S): at 10:00

## 2021-08-31 RX ADMIN — DABIGATRAN ETEXILATE MESYLATE 150 MILLIGRAM(S): 150 CAPSULE ORAL at 12:27

## 2021-08-31 RX ADMIN — ACETAZOLAMIDE 500 MILLIGRAM(S): 250 TABLET ORAL at 17:11

## 2021-08-31 RX ADMIN — Medication 650 MILLIGRAM(S): at 23:05

## 2021-08-31 RX ADMIN — Medication 1 DROP(S): at 12:27

## 2021-08-31 RX ADMIN — Medication 650 MILLIGRAM(S): at 17:31

## 2021-08-31 NOTE — PROGRESS NOTE ADULT - ASSESSMENT
42 y/o right-handed Burkinan-speaking M with PMH of cervical herniated discs/shoulder injury presented with sudden onset headache at 1am on 8/25 described as worse headache of his life, sudden in onset while sitting on his couch watching TV when he suddenly developed a 10/10 diffuse headache and blurry vision. CTA head/neck showed diffuse dural venous sinus thrombosis with clot involving the mid and posterior superior sagittal sinus, torcula, bilateral transverse and right sigmoid sinuses, and right internal jugular vein.      Impression: Headache and vision changes secondary to extensive central venous sinus thromboses of unknown etiology, hypercoagulable states should be screened for.     NEURO: Patient with extensive VST, with no deep component, currently neurosurgery states not interventional candidate.  Neurologically without acute change, no longer has headache after caffeine given, Continue monitoring for neurologic deterioration in setting of cerebral edema with mass effect and brain compression, EEG done no seizures noted, normotension as tolerated statin based on outpatient risk stratification if indicated, MR imaging noted. repeat MR MRV pending to ensure clot burden improving on 8/31, Physical therapy/OT: home with outpatient therapy. MRI with evidence of increased ICP, opthalmology re-evaluated on 8/29 and did not see papilledema, started Diamox 500mg BID.     ANTITHROMBOTIC THERAPY: dabigatran, timeline based on clinical and radiological improvement at follow up based on further workup.     PULMONARY:   protecting airway, saturating well     CARDIOVASCULAR:  TTE:  ef 60%, cardiac monitoring with no acute events noted                              SBP goal: normotension overall, 110-160mmhg     GASTROINTESTINAL:  dysphagia screen passed, tolerating diet , GI consulted Dr Grant :  Appearance of mild inflammation of the right colon/hepatic flexure may be due to motion. The possibility of colitis cannot be excluded. Additional colonic diverticulosis, without diverticulitis. Borderline mild distention of the appendiceal tip without surrounding inflammation or secondary sign of acute appendicitis. Correlate for any right-sided abdominal pain. Gallbladder sludge, no acute si/sx of active infection or GI symptoms suspect normal variant per GI, serial abdominal exams, diet as tolerated, will continue to monitor.      Diet: regular     RENAL: BUN/Cr within normal limits, good urine output, maintain adequate hydration       Na Goal: Greater than 135     Garrett: n     HEMATOLOGY: H/H without change, no signs of bleeding, CT CAP with no evidence of malignancy , he should have all age and risk appropriate malignancy screenings , hyper coag panel. LE duplex with no evidence of DVT.      DVT ppx: Dabigatran      ID: afebrile, no leukocytosis, no hx of covid or vaccine. covid antibodies sent.     OTHER: condition and plan of care d/w patient, questions and concerns addressed.      DISPOSITION: Home with outpatient therapy once stable and workup complete.     CORE MEASURES:        Admission NIHSS: 0     TPA: [] YES [x] NO      LDL/HDL: 125/37     Depression Screen: p     Statin Therapy: as noted      Dysphagia Screen: [x] PASS [] FAIL     Smoking [] YES [x] NO      Afib [] YES [x] NO     Stroke Education [x] YES [] NO    Obtain screening lower extremity venous ultrasound in patients who meet 1 or more of the following criteria as patient is high risk for DVT/PE on admission:   [] History of DVT/PE  []Hypercoagulable states (Factor V Leiden, Cancer, OCP, etc. )  []Prolonged immobility (hemiplegia/hemiparesis/post operative or any other extended immobilization)  [] Transferred from outside facility (Rehab or Long term care)  [] Age </= to 50       42 y/o right-handed Yemeni-speaking M with PMH of cervical herniated discs/shoulder injury presented with sudden onset headache at 1am on 8/25 described as worse headache of his life, sudden in onset while sitting on his couch watching TV when he suddenly developed a 10/10 diffuse headache and blurry vision. CTA head/neck showed diffuse dural venous sinus thrombosis with clot involving the mid and posterior superior sagittal sinus, torcula, bilateral transverse and right sigmoid sinuses, and right internal jugular vein.      Impression: Headache and vision changes secondary to extensive central venous sinus thromboses of unknown etiology, hypercoagulable states should be screened for.     NEURO: Patient with extensive VST, with no deep component. Pending MRI/ MRV today to ensure clot burden improving on 8/31 or may need angio. Neurologically without acute change, no longer has headache after caffeine given, Continue monitoring for neurologic deterioration in setting of cerebral edema with mass effect and brain compression, EEG done no seizures noted, normotension as tolerated statin based on outpatient risk stratification if indicated, MR imaging noted. Physical therapy/OT: home with outpatient therapy. MRI with evidence of increased ICP, opthalmology re-evaluated on 8/29 and did not see papilledema, started Diamox 500mg BID.     ANTITHROMBOTIC THERAPY: dabigatran, timeline based on clinical and radiological improvement at follow up based on further workup.     PULMONARY:   protecting airway, saturating well on room air     CARDIOVASCULAR:  TTE: EF 60%, cardiac monitoring with no acute events noted                              SBP goal: normotension overall, 110-160mmhg     GASTROINTESTINAL:  dysphagia screen passed, tolerating diet , GI consulted Dr Grant :  Appearance of mild inflammation of the right colon/hepatic flexure may be due to motion. The possibility of colitis cannot be excluded. Additional colonic diverticulosis, without diverticulitis. Borderline mild distention of the appendiceal tip without surrounding inflammation or secondary sign of acute appendicitis. Correlate for any right-sided abdominal pain. Gallbladder sludge, no acute si/sx of active infection or GI symptoms suspect normal variant per GI, serial abdominal exams, diet as tolerated, will continue to monitor.      Diet: regular     RENAL: BUN/Cr within normal limits, good urine output, maintain adequate hydration       Na Goal: Greater than 135     Garrett: n     HEMATOLOGY: H/H without change, no signs of bleeding, CT CAP with no evidence of malignancy , he should have all age and risk appropriate malignancy screenings, hyper coag panel sent. LE duplex with no evidence of DVT.      DVT ppx: Dabigatran      ID: afebrile, no leukocytosis, no hx of covid or vaccine. covid antibodies sent.     OTHER: condition and plan of care d/w patient, questions and concerns addressed.      DISPOSITION: Home with outpatient therapy once stable and workup complete.     CORE MEASURES:        Admission NIHSS: 0     TPA: [] YES [x] NO      LDL/HDL: 125/37     Depression Screen: p     Statin Therapy: as noted      Dysphagia Screen: [x] PASS [] FAIL     Smoking [] YES [x] NO      Afib [] YES [x] NO     Stroke Education [x] YES [] NO    Obtain screening lower extremity venous ultrasound in patients who meet 1 or more of the following criteria as patient is high risk for DVT/PE on admission:   [] History of DVT/PE  []Hypercoagulable states (Factor V Leiden, Cancer, OCP, etc. )  []Prolonged immobility (hemiplegia/hemiparesis/post operative or any other extended immobilization)  [] Transferred from outside facility (Rehab or Long term care)  [] Age </= to 50

## 2021-08-31 NOTE — PROGRESS NOTE ADULT - SUBJECTIVE AND OBJECTIVE BOX
Patient seen and examined at bedside.    --Anticoagulation--  dabigatran 150 milliGRAM(s) Oral two times a day    Exam: AOx3, VFF (subj blurry vision), EOMI, no facial, RAMIREZ 5/5, no drift, SILT.   Italian speaking    08-30    135  |  109<H>  |  10  ----------------------------<  94  3.9   |  14<L>  |  0.88    Ca    9.1      30 Aug 2021 07:02  Phos  3.2     08-30  Mg     2.2     08-30    ICU Vital Signs Last 24 Hrs  T(C): 36.7 (31 Aug 2021 04:25), Max: 37.1 (30 Aug 2021 23:24)  T(F): 98.1 (31 Aug 2021 04:25), Max: 98.8 (30 Aug 2021 23:24)  HR: 79 (31 Aug 2021 04:25) (66 - 96)  BP: 108/73 (31 Aug 2021 04:25) (104/69 - 112/74)  BP(mean): --  ABP: --  ABP(mean): --  RR: 18 (31 Aug 2021 04:25) (17 - 18)  SpO2: 99% (31 Aug 2021 04:25) (97% - 100%)                        14.3   5.64  )-----------( 200      ( 30 Aug 2021 07:02 )             44.1

## 2021-08-31 NOTE — PROGRESS NOTE ADULT - ASSESSMENT
41M no sig PMHx, xfer [facility] for stroke tx of extensive CVST, pw sudden onset WHOL AM 8/25 a/w blurry vision and 1x vomiting. Continues to have severe frontal and retroorbital HA, and R-sided neck pain. CTH non con w/ hyperdense SSS and b/l TS, no ICH. CTA/V w/ diffuse VST involving posterior 2/3 SSS, b/l TS, R sigmoid sinus into IJ, patent deep venous system.  8/31:   - ADM neurology, q4h neuro checks  - On Pradaxa for VST  - AEDs per neurology  - Ophtho started on diamox for c/f papilledema  - Repeat MRI/V today to see if clearing thrombus, otherwise may need angio
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
## 2021-09-01 ENCOUNTER — TRANSCRIPTION ENCOUNTER (OUTPATIENT)
Age: 41
End: 2021-09-01

## 2021-09-01 VITALS
HEART RATE: 75 BPM | OXYGEN SATURATION: 99 % | RESPIRATION RATE: 18 BRPM | TEMPERATURE: 98 F | SYSTOLIC BLOOD PRESSURE: 116 MMHG | DIASTOLIC BLOOD PRESSURE: 79 MMHG

## 2021-09-01 PROCEDURE — 86850 RBC ANTIBODY SCREEN: CPT

## 2021-09-01 PROCEDURE — 71260 CT THORAX DX C+: CPT

## 2021-09-01 PROCEDURE — 86769 SARS-COV-2 COVID-19 ANTIBODY: CPT

## 2021-09-01 PROCEDURE — 97165 OT EVAL LOW COMPLEX 30 MIN: CPT

## 2021-09-01 PROCEDURE — 84100 ASSAY OF PHOSPHORUS: CPT

## 2021-09-01 PROCEDURE — 97530 THERAPEUTIC ACTIVITIES: CPT

## 2021-09-01 PROCEDURE — 70450 CT HEAD/BRAIN W/O DYE: CPT

## 2021-09-01 PROCEDURE — 85027 COMPLETE CBC AUTOMATED: CPT

## 2021-09-01 PROCEDURE — 97116 GAIT TRAINING THERAPY: CPT

## 2021-09-01 PROCEDURE — 93306 TTE W/DOPPLER COMPLETE: CPT

## 2021-09-01 PROCEDURE — 74177 CT ABD & PELVIS W/CONTRAST: CPT

## 2021-09-01 PROCEDURE — 85306 CLOT INHIBIT PROT S FREE: CPT

## 2021-09-01 PROCEDURE — 85730 THROMBOPLASTIN TIME PARTIAL: CPT

## 2021-09-01 PROCEDURE — 93970 EXTREMITY STUDY: CPT

## 2021-09-01 PROCEDURE — 95816 EEG AWAKE AND DROWSY: CPT

## 2021-09-01 PROCEDURE — 83090 ASSAY OF HOMOCYSTEINE: CPT

## 2021-09-01 PROCEDURE — 70543 MRI ORBT/FAC/NCK W/O &W/DYE: CPT

## 2021-09-01 PROCEDURE — 83735 ASSAY OF MAGNESIUM: CPT

## 2021-09-01 PROCEDURE — 97161 PT EVAL LOW COMPLEX 20 MIN: CPT

## 2021-09-01 PROCEDURE — 83036 HEMOGLOBIN GLYCOSYLATED A1C: CPT

## 2021-09-01 PROCEDURE — 70546 MR ANGIOGRAPH HEAD W/O&W/DYE: CPT

## 2021-09-01 PROCEDURE — 85610 PROTHROMBIN TIME: CPT

## 2021-09-01 PROCEDURE — A9585: CPT

## 2021-09-01 PROCEDURE — 80048 BASIC METABOLIC PNL TOTAL CA: CPT

## 2021-09-01 PROCEDURE — 86900 BLOOD TYPING SEROLOGIC ABO: CPT

## 2021-09-01 PROCEDURE — 70553 MRI BRAIN STEM W/O & W/DYE: CPT

## 2021-09-01 PROCEDURE — 85303 CLOT INHIBIT PROT C ACTIVITY: CPT

## 2021-09-01 PROCEDURE — 85025 COMPLETE CBC W/AUTO DIFF WBC: CPT

## 2021-09-01 PROCEDURE — 86901 BLOOD TYPING SEROLOGIC RH(D): CPT

## 2021-09-01 PROCEDURE — 80061 LIPID PANEL: CPT

## 2021-09-01 PROCEDURE — 80053 COMPREHEN METABOLIC PANEL: CPT

## 2021-09-01 RX ORDER — DABIGATRAN ETEXILATE MESYLATE 150 MG/1
1 CAPSULE ORAL
Qty: 0 | Refills: 0 | DISCHARGE
Start: 2021-09-01

## 2021-09-01 RX ORDER — ACETAZOLAMIDE 250 MG/1
2 TABLET ORAL
Qty: 120 | Refills: 0
Start: 2021-09-01 | End: 2021-09-30

## 2021-09-01 RX ORDER — ACETAZOLAMIDE 250 MG/1
2 TABLET ORAL
Qty: 0 | Refills: 0 | DISCHARGE
Start: 2021-09-01

## 2021-09-01 RX ORDER — DABIGATRAN ETEXILATE MESYLATE 150 MG/1
1 CAPSULE ORAL
Qty: 60 | Refills: 0
Start: 2021-09-01 | End: 2021-09-30

## 2021-09-01 RX ADMIN — Medication 650 MILLIGRAM(S): at 11:36

## 2021-09-01 RX ADMIN — Medication 650 MILLIGRAM(S): at 05:48

## 2021-09-01 RX ADMIN — ACETAZOLAMIDE 500 MILLIGRAM(S): 250 TABLET ORAL at 05:45

## 2021-09-01 RX ADMIN — DABIGATRAN ETEXILATE MESYLATE 150 MILLIGRAM(S): 150 CAPSULE ORAL at 11:35

## 2021-09-01 RX ADMIN — Medication 650 MILLIGRAM(S): at 12:06

## 2021-09-01 RX ADMIN — Medication 1 DROP(S): at 11:36

## 2021-09-01 RX ADMIN — SODIUM CHLORIDE 100 MILLILITER(S): 9 INJECTION INTRAMUSCULAR; INTRAVENOUS; SUBCUTANEOUS at 11:36

## 2021-09-01 NOTE — DISCHARGE NOTE NURSING/CASE MANAGEMENT/SOCIAL WORK - PATIENT PORTAL LINK FT
You can access the FollowMyHealth Patient Portal offered by [facility] by registering at the following website: [email or web address]. By joining [employer]’s FollowMyHealth portal, you will also be able to view your health information using other applications (apps) compatible with our system.

## 2021-09-01 NOTE — PROGRESS NOTE ADULT - PROVIDER SPECIALTY LIST ADULT
Neurosurgery
Neurosurgery
Neurology
Neurology
Neurosurgery
Neurosurgery
Ophthalmology
Gastroenterology
Gastroenterology
Neurology
Neurosurgery
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
## 2021-09-01 NOTE — DISCHARGE NOTE PROVIDER - CARE PROVIDERS DIRECT ADDRESSES
,DirectAddress_Unknown,[email or web address] ,DirectAddress_Unknown,[email or web address],DirectAddress_Unknown
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
## 2021-09-01 NOTE — DISCHARGE NOTE PROVIDER - NSDCMRMEDTOKEN_GEN_ALL_CORE_FT
acetaZOLAMIDE 250 mg oral tablet: 2 tab(s) orally 2 times a day  dabigatran 150 mg oral capsule: 1 cap(s) orally 2 times a day  Medrol Dosepak 4 mg oral tablet: 1 packet(s) orally once   ocular lubricant ophthalmic solution: 1 drop(s) to each affected eye once a day  Tylenol 325 mg oral tablet: 2 tab(s) orally every 6 hours, As needed, Mild Pain (1 - 3)

## 2021-09-01 NOTE — DISCHARGE NOTE PROVIDER - NSDCCPCAREPLAN_GEN_ALL_CORE_FT
PRINCIPAL DISCHARGE DIAGNOSIS  Diagnosis: Cerebral venous sinus thrombosis  Assessment and Plan of Treatment:        PRINCIPAL DISCHARGE DIAGNOSIS  Diagnosis: Cerebral venous sinus thrombosis  Assessment and Plan of Treatment: - Please take all your medications as prescribed.  - Continue dabigatran 150mg twice daily and diamox 500mg twice daily  - Start the medrol dosepak and take as prescribed  - Follow up with Neurology outpatient in 1 week for continued management, during which time the duration of dabigatran therapy will be determined (Dr. Marino, [phone], [facility]).  - Follow up with neuro-ophthalmology in 1 week (Dr. Kim, [phone], [facility])  - Outpatient physical therapy  Cerebral venous sinus thrombosis (CVST) occurs when a blood clot forms in the brain’s venous sinuses  Please present to the ED or call 911 if you have worsening or new symptoms such as fainting, losing control of a part of your body, and seizures.  Lead a healthy lifestyle which includes eating a low-fat diet made up mostly of fruits and vegetables, low-fat meats and proteins, low-fat dairy products, and whole-fiber grains, breads, cereals, and pasta. Avoid smoking.
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
## 2021-09-01 NOTE — DISCHARGE NOTE PROVIDER - PROVIDER TOKENS
PROVIDER:[TOKEN:[[id number]],FOLLOWUP:[1 week]],PROVIDER:[TOKEN:[[id number]],FOLLOWUP:[1 week]] PROVIDER:[TOKEN:[[id number]],FOLLOWUP:[1 week]],PROVIDER:[TOKEN:[[id number]],FOLLOWUP:[1 week]],PROVIDER:[TOKEN:[[id number]],FOLLOWUP:[2 weeks]]

## 2021-09-01 NOTE — PROGRESS NOTE ADULT - SUBJECTIVE AND OBJECTIVE BOX
Patient seen and examined at bedside.    --Anticoagulation--    T(C): 36.5 (09-01-21 @ 04:46), Max: 36.8 (08-31-21 @ 09:03)  HR: 67 (09-01-21 @ 04:46) (63 - 85)  BP: 106/69 (09-01-21 @ 04:46) (100/65 - 119/78)  RR: 18 (09-01-21 @ 04:46) (17 - 18)  SpO2: 98% (09-01-21 @ 04:46) (97% - 99%)  Wt(kg): --    Exam: AOx3, VFF (subj blurry vision), EOMI, no facial, RAMIREZ 5/5, no drift, SILT.     Imaging: thrombus looks stable    .  LABS:                         14.3   5.64  )-----------( 200      ( 30 Aug 2021 07:02 )             44.1     08-30    135  |  109<H>  |  10  ----------------------------<  94  3.9   |  14<L>  |  0.88    Ca    9.1      30 Aug 2021 07:02  Phos  3.2     08-30  Mg     2.2     08-30                RADIOLOGY, EKG & ADDITIONAL TESTS: Reviewed.

## 2021-09-01 NOTE — DISCHARGE NOTE PROVIDER - CARE PROVIDER_API CALL
Marek Marino (DO)  Neurology; Vascular Neurology  [address] [facility], [address]  [place] [address]  Phone: [phone]  Fax: [phone]  Follow Up Time: 1 week    Solomon Kim)  Ophthalmology  [address] [place] [address], [address]  [place] [address]  Phone: [phone]  Fax: [phone]  Follow Up Time: 1 week   Marek Marino (DO)  Neurology; Vascular Neurology  [address] [facility], [address]  [place] [address]  Phone: [phone]  Fax: [phone]  Follow Up Time: 1 week    Solomon Kim (MD)  Ophthalmology  [address] [facility], [address]  [place] [address]  Phone: [phone]  Fax: [phone]  Follow Up Time: 1 week    Leonel Mendes; MPH)  Radiology  [address] [facility], [address]  [place] [address]  Phone: [phone]  Fax: [phone]  Follow Up Time: 2 weeks
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
## 2021-09-07 PROBLEM — Z00.00 ENCOUNTER FOR PREVENTIVE HEALTH EXAMINATION: Status: ACTIVE | Noted: 2021-09-07

## 2021-09-10 ENCOUNTER — NON-APPOINTMENT (OUTPATIENT)
Age: 41
End: 2021-09-10

## 2021-09-10 ENCOUNTER — APPOINTMENT (OUTPATIENT)
Dept: OPHTHALMOLOGY | Facility: CLINIC | Age: 41
End: 2021-09-10
Payer: MEDICAID

## 2021-09-10 PROCEDURE — 99205 OFFICE O/P NEW HI 60 MIN: CPT

## 2021-09-10 PROCEDURE — 92083 EXTENDED VISUAL FIELD XM: CPT

## 2021-09-21 ENCOUNTER — APPOINTMENT (OUTPATIENT)
Dept: NEUROSURGERY | Facility: CLINIC | Age: 41
End: 2021-09-21

## 2021-10-08 DIAGNOSIS — G08 INTRACRANIAL AND INTRASPINAL PHLEBITIS AND THROMBOPHLEBITIS: ICD-10-CM

## 2021-11-11 ENCOUNTER — APPOINTMENT (OUTPATIENT)
Dept: OPHTHALMOLOGY | Facility: CLINIC | Age: 41
End: 2021-11-11

## 2021-11-16 ENCOUNTER — APPOINTMENT (OUTPATIENT)
Dept: MRI IMAGING | Facility: IMAGING CENTER | Age: 41
End: 2021-11-16

## 2021-11-18 ENCOUNTER — APPOINTMENT (OUTPATIENT)
Dept: NEUROSURGERY | Facility: CLINIC | Age: 41
End: 2021-11-18

## 2022-04-22 NOTE — OCCUPATIONAL THERAPY INITIAL EVALUATION ADULT - DIAGNOSIS, OT EVAL
Pre and Post reprogramming of pt's [employer]  ICD/PPM for MRI was done by Shree ESTRADA with [employer].      
Pt currently presents with decreased endurance, strength limiting independence with ADLs and functional mobility.

## 2025-03-10 ENCOUNTER — INPATIENT (INPATIENT)
Facility: HOSPITAL | Age: 45
LOS: 1 days | Discharge: HOME | DRG: 74 | End: 2025-03-12
Attending: ORTHOPAEDIC SURGERY | Admitting: ORTHOPAEDIC SURGERY
Payer: MEDICAID

## 2025-03-10 VITALS
OXYGEN SATURATION: 100 % | RESPIRATION RATE: 16 BRPM | TEMPERATURE: 98 F | HEIGHT: 67 IN | WEIGHT: 160.06 LBS | SYSTOLIC BLOOD PRESSURE: 145 MMHG | DIASTOLIC BLOOD PRESSURE: 84 MMHG | HEART RATE: 78 BPM

## 2025-03-10 DIAGNOSIS — Z98.890 OTHER SPECIFIED POSTPROCEDURAL STATES: Chronic | ICD-10-CM

## 2025-03-10 LAB
ANION GAP SERPL CALC-SCNC: 7 MMOL/L — SIGNIFICANT CHANGE UP (ref 5–17)
APTT BLD: 33.1 SEC — SIGNIFICANT CHANGE UP (ref 24.5–35.6)
BUN SERPL-MCNC: 14 MG/DL — SIGNIFICANT CHANGE UP (ref 7–23)
CALCIUM SERPL-MCNC: 9.3 MG/DL — SIGNIFICANT CHANGE UP (ref 8.5–10.1)
CHLORIDE SERPL-SCNC: 105 MMOL/L — SIGNIFICANT CHANGE UP (ref 96–108)
CO2 SERPL-SCNC: 27 MMOL/L — SIGNIFICANT CHANGE UP (ref 22–31)
CREAT SERPL-MCNC: 0.94 MG/DL — SIGNIFICANT CHANGE UP (ref 0.5–1.3)
EGFR: 101 ML/MIN/1.73M2 — SIGNIFICANT CHANGE UP
EGFR: 101 ML/MIN/1.73M2 — SIGNIFICANT CHANGE UP
GLUCOSE SERPL-MCNC: 94 MG/DL — SIGNIFICANT CHANGE UP (ref 70–99)
HCT VFR BLD CALC: 46.7 % — SIGNIFICANT CHANGE UP (ref 39–50)
HGB BLD-MCNC: 15.3 G/DL — SIGNIFICANT CHANGE UP (ref 13–17)
INR BLD: 0.97 RATIO — SIGNIFICANT CHANGE UP (ref 0.85–1.16)
MCHC RBC-ENTMCNC: 29.1 PG — SIGNIFICANT CHANGE UP (ref 27–34)
MCHC RBC-ENTMCNC: 32.8 G/DL — SIGNIFICANT CHANGE UP (ref 32–36)
MCV RBC AUTO: 88.8 FL — SIGNIFICANT CHANGE UP (ref 80–100)
NRBC BLD AUTO-RTO: 0 /100 WBCS — SIGNIFICANT CHANGE UP (ref 0–0)
PLATELET # BLD AUTO: 263 K/UL — SIGNIFICANT CHANGE UP (ref 150–400)
POTASSIUM SERPL-MCNC: 4.5 MMOL/L — SIGNIFICANT CHANGE UP (ref 3.5–5.3)
POTASSIUM SERPL-SCNC: 4.5 MMOL/L — SIGNIFICANT CHANGE UP (ref 3.5–5.3)
PROTHROM AB SERPL-ACNC: 11.5 SEC — SIGNIFICANT CHANGE UP (ref 9.9–13.4)
RBC # BLD: 5.26 M/UL — SIGNIFICANT CHANGE UP (ref 4.2–5.8)
RBC # FLD: 12.1 % — SIGNIFICANT CHANGE UP (ref 10.3–14.5)
SODIUM SERPL-SCNC: 139 MMOL/L — SIGNIFICANT CHANGE UP (ref 135–145)
WBC # BLD: 3.74 K/UL — LOW (ref 3.8–10.5)
WBC # FLD AUTO: 3.74 K/UL — LOW (ref 3.8–10.5)

## 2025-03-10 PROCEDURE — 71045 X-RAY EXAM CHEST 1 VIEW: CPT | Mod: 26

## 2025-03-10 PROCEDURE — 72125 CT NECK SPINE W/O DYE: CPT | Mod: 26

## 2025-03-10 PROCEDURE — 99221 1ST HOSP IP/OBS SF/LOW 40: CPT | Mod: GC

## 2025-03-10 PROCEDURE — 93010 ELECTROCARDIOGRAM REPORT: CPT

## 2025-03-10 PROCEDURE — 99285 EMERGENCY DEPT VISIT HI MDM: CPT

## 2025-03-10 RX ORDER — OXYCODONE HYDROCHLORIDE 30 MG/1
5 TABLET ORAL EVERY 4 HOURS
Refills: 0 | Status: DISCONTINUED | OUTPATIENT
Start: 2025-03-10 | End: 2025-03-11

## 2025-03-10 RX ORDER — CYCLOBENZAPRINE HYDROCHLORIDE 15 MG/1
10 CAPSULE, EXTENDED RELEASE ORAL ONCE
Refills: 0 | Status: COMPLETED | OUTPATIENT
Start: 2025-03-10 | End: 2025-03-10

## 2025-03-10 RX ORDER — NAPROXEN SODIUM 275 MG
500 TABLET ORAL ONCE
Refills: 0 | Status: COMPLETED | OUTPATIENT
Start: 2025-03-10 | End: 2025-03-10

## 2025-03-10 RX ORDER — OXYCODONE HYDROCHLORIDE 30 MG/1
10 TABLET ORAL EVERY 4 HOURS
Refills: 0 | Status: DISCONTINUED | OUTPATIENT
Start: 2025-03-10 | End: 2025-03-11

## 2025-03-10 RX ORDER — B1/B2/B3/B5/B6/B12/VIT C/FOLIC 500-0.5 MG
1 TABLET ORAL DAILY
Refills: 0 | Status: DISCONTINUED | OUTPATIENT
Start: 2025-03-10 | End: 2025-03-11

## 2025-03-10 RX ORDER — CYCLOBENZAPRINE HYDROCHLORIDE 15 MG/1
5 CAPSULE, EXTENDED RELEASE ORAL THREE TIMES A DAY
Refills: 0 | Status: DISCONTINUED | OUTPATIENT
Start: 2025-03-10 | End: 2025-03-11

## 2025-03-10 RX ORDER — MAGNESIUM HYDROXIDE 400 MG/5ML
30 SUSPENSION ORAL DAILY
Refills: 0 | Status: DISCONTINUED | OUTPATIENT
Start: 2025-03-10 | End: 2025-03-11

## 2025-03-10 RX ORDER — TRAMADOL HYDROCHLORIDE 50 MG/1
50 TABLET, FILM COATED ORAL EVERY 6 HOURS
Refills: 0 | Status: DISCONTINUED | OUTPATIENT
Start: 2025-03-10 | End: 2025-03-11

## 2025-03-10 RX ADMIN — CYCLOBENZAPRINE HYDROCHLORIDE 10 MILLIGRAM(S): 15 CAPSULE, EXTENDED RELEASE ORAL at 09:28

## 2025-03-10 RX ADMIN — Medication 500 MILLIGRAM(S): at 09:28

## 2025-03-10 RX ADMIN — Medication 1 TABLET(S): at 13:10

## 2025-03-10 NOTE — ED PROVIDER NOTE - DIFFERENTIAL DIAGNOSIS
Differential Diagnosis Acute cervical strain sprain possible cervical radiculopathy from herniated disc

## 2025-03-10 NOTE — CONSULT NOTE ADULT - SUBJECTIVE AND OBJECTIVE BOX
Initial Hospitalist Consult Note  Department of Medicine at [facility]    Patient is a 45y old  Male who presents with a chief complaint of C4-6 ACDF (10 Mar 2025 10:36)    HPI:  Orthopaedic Surgery History and Physical  Georgian intepreter: [id number]    Patient is a 45y community-ambulator without assistive devices who presents to [place] ED w/ a c/o of neck pain. Patient states that neck pain began 10 days prior and has worsened today. Patient states that he does not recal any truama or injuries that could have  caused this. This is the first event of the neck pain per the patient. Patient states that he has numbness and tingling of the bilateral upper extremities, especially when laying down or moving the extremities. Patient states that his pain is limited to the neck and does not radiate down his arms. Patient denies any saddle anesthesia, or bowel/bladder incontinence. Patient is able to ambulate without assistance and able to perform all activities of daily  living. Patient states that the medicine given by the ED has improved his symptoms. Denies any other orthopedic concerns at this time. Patient states that he has no medical history or take any medications, however has not seen a doctor in many years.    Interval History: Pt seen and evaluated at bedside for pre-op clearance. States he had a "semi stroke" 3.5 years ago after hitting his head at work but was discharged the same day. Otherwise, denies any history of cardiac conditions including aortic stenosis, afib, CAD, MI, PPM. Denies any respiratory/lung disease. Denies any current CV symptoms including chest pain, claudication, dyspnea on exertion, orthopnea, and palpitations.       PAST MEDICAL & SURGICAL HISTORY:  Herniated cervical disc      S/P shoulder surgery  R shoulder        [] No significant past history as reviewed with the patient and family    FAMILY HISTORY:    [] Family history not pertinent as reviewed with the patient and family    SOCIAL HISTORY:    MEDICATIONS  (STANDING):  sodium chloride 0.9%. 1000 milliLiter(s) (125 mL/Hr) IV Continuous <Continuous>    MEDICATIONS  (PRN):    Allergies    No Known Allergies    Intolerances        REVIEW OF SYSTEMS  ROS as noted in HPI.    Vital Signs Last 24 Hrs  T(C): 36.6 (10 Mar 2025 10:08), Max: 36.7 (10 Mar 2025 08:44)  T(F): 97.8 (10 Mar 2025 10:08), Max: 98 (10 Mar 2025 08:44)  HR: 70 (10 Mar 2025 10:08) (70 - 78)  BP: 113/73 (10 Mar 2025 10:08) (113/73 - 145/84)  BP(mean): --  RR: 16 (10 Mar 2025 10:08) (16 - 16)  SpO2: 99% (10 Mar 2025 10:08) (99% - 100%)    Parameters below as of 10 Mar 2025 10:08  Patient On (Oxygen Delivery Method): room air          PHYSICAL EXAM:  Gen: lying comfortably, in NAD  TTP cervical spine  Motor:                   C5                C6              C7               C8           T1   R            5/5                5/5            5/5             5/5          5/5  L             5/5               5/5             5/5             5/5          5/5                L2             L3             L4               L5            S1  R         5/5           5/5          5/5             5/5           5/5  L          5/5          5/5           5/5             5/5           5/5    Sensory:            C5         C6         C7      C8       T1        (0=absent, 1=impaired, 2=normal, NT=not testable)  R         2            2           2        2         2  L          2            2           2        2         2               L2          L3         L4      L5       S1         (0=absent, 1=impaired, 2=normal, NT=not testable)  R         2            2            2        2        2  L          2            2           2        2         2    Negative Wylie, Babinski, and clonus bilaterally  Radial 2+ bilaterally  DP 2+ bilaterally   Able to open and close bilateral hands repeatedly with no issues                    IMAGING STUDIES:    < from: CT Cervical Spine No Cont (03.10.25 @ 09:51) >  The atlantodental interval is within normal limits. The lateral masses   are properly aligned. No facet subluxation or malalignment at the   craniovertebral or atlantoaxial articulations. No dens fracture. No   significant prevertebral soft tissue swelling.    Straightening of the cervical lordosis. Normal alignment of the cervical   vertebrae. Vertebral body height is well-maintained. Reduced   intervertebral disc height. No jumped or perched facets. No acute osseous   fracture.    No large disc bulge. The bilateral neuroforamina are largely patent. No   gross evidence of critical narrowing of the spinal canal.    The paraspinal muscles are unremarkable.    Visualized portions of the thyroid are unremarkable.    The visualized lung apices are within normal limits.    < end of copied text >      ASSESSMENT  45y Male with cervical spine radiculopathy    PLAN  - Admit to orthopaedic surgery for C4-6 ACDF tomorrow with Dr. Banerjee  - WBAT  - DVT ppx: Hold chemical ppx, SCDs okay  - Preop labs AM  - NPO at midnight except for meds  - Please medically optimize and document prior to surgery  - Medical comanagement appreciated  - Pain control as needed    Discussed with Dr. Banerjee and will update with any further recommednations (10 Mar 2025 10:36)      I&O's Summary      PAST MEDICAL & SURGICAL HISTORY:  Herniated cervical disc      S/P shoulder surgery  R shoulder          FAMILY HISTORY:      Allergies    No Known Allergies    Intolerances        SOCIAL HISTORY  Alcohol:  Tobacco:  Illicit substance use: denies    Home Medications:  acetaZOLAMIDE 250 mg oral tablet: 2 tab(s) orally 2 times a day (01 Sep 2021 11:45)  ocular lubricant ophthalmic solution: 1 drop(s) to each affected eye once a day (01 Sep 2021 11:25)  Tylenol 325 mg oral tablet: 2 tab(s) orally every 6 hours, As needed, Mild Pain (1 - 3) (25 Aug 2021 15:06)        LABS:                        15.3   3.74  )-----------( 263      ( 10 Mar 2025 11:00 )             46.7     03-10    139  |  105  |  14  ----------------------------<  94  4.5   |  27  |  0.94    Ca    9.3      10 Mar 2025 11:00      PT/INR - ( 10 Mar 2025 11:00 )   PT: 11.5 sec;   INR: 0.97 ratio         PTT - ( 10 Mar 2025 11:00 )  PTT:33.1 sec  Urinalysis Basic - ( 10 Mar 2025 11:00 )    Color: x / Appearance: x / SG: x / pH: x  Gluc: 94 mg/dL / Ketone: x  / Bili: x / Urobili: x   Blood: x / Protein: x / Nitrite: x   Leuk Esterase: x / RBC: x / WBC x   Sq Epi: x / Non Sq Epi: x / Bacteria: x      CAPILLARY BLOOD GLUCOSE            Urinalysis Basic - ( 10 Mar 2025 11:00 )    Color: x / Appearance: x / SG: x / pH: x  Gluc: 94 mg/dL / Ketone: x  / Bili: x / Urobili: x   Blood: x / Protein: x / Nitrite: x   Leuk Esterase: x / RBC: x / WBC x   Sq Epi: x / Non Sq Epi: x / Bacteria: x        MEDICATIONS  (STANDING):  multivitamin 1 Tablet(s) Oral daily  sodium chloride 0.9%. 1000 milliLiter(s) (125 mL/Hr) IV Continuous <Continuous>    MEDICATIONS  (PRN):  cyclobenzaprine 5 milliGRAM(s) Oral three times a day PRN Muscle Spasm  magnesium hydroxide Suspension 30 milliLiter(s) Oral daily PRN Constipation  oxyCODONE    IR 10 milliGRAM(s) Oral every 4 hours PRN Severe Pain (7 - 10)  oxyCODONE    IR 5 milliGRAM(s) Oral every 4 hours PRN Moderate Pain (4 - 6)  traMADol 50 milliGRAM(s) Oral every 6 hours PRN Mild Pain (1 - 3)      REVIEW OF SYSTEMS:  CONSTITUTIONAL: denies fever, chills, fatigue, weakness  HEENT: (+) neck pain   SKIN: denies new lesions, rash  CARDIOVASCULAR: denies chest pain, chest pressure, palpitations  RESPIRATORY: denies shortness of breath, sputum production  GASTROINTESTINAL: denies nausea, vomiting, diarrhea, abdominal pain  GENITOURINARY: denies dysuria, discharge  NEUROLOGICAL: denies numbness, headache, focal weakness  MUSCULOSKELETAL: denies new joint pain, muscle aches  HEMATOLOGIC: denies gross bleeding, bruising  LYMPHATICS: denies enlarged lymph nodes, extremity swelling  PSYCHIATRIC: denies recent changes in anxiety, depression  ENDOCRINOLOGIC: denies sweating, cold or heat intolerance    RADIOLOGY & ADDITIONAL TESTS:    EKG:    Imaging Personally Reviewed:  [x] YES  [ ] NO    EKG Personally Reviewed:  [x] YES  [ ] NO    Consultant(s) Notes Reviewed:  [x] YES  [ ] NO        PHYSICAL EXAM:  T(F): 97.5 (03-10-25 @ 14:01), Max: 98 (03-10-25 @ 08:44)  HR: 74 (03-10-25 @ 14:01) (70 - 78)  BP: 120/75 (03-10-25 @ 14:01) (113/73 - 145/84)  RR: 17 (03-10-25 @ 14:01) (16 - 17)  SpO2: 98% (03-10-25 @ 14:01) (98% - 100%)    GENERAL: NAD, well-groomed, well-developed  HEAD:  Atraumatic, Normocephalic  EYES: EOMI, PERRL, conjunctiva and sclera clear  ENMT: No tonsillar erythema, exudates, or enlargement; Moist mucous membranes  NECK: Supple, No JVD, Normal thyroid  NERVOUS SYSTEM:  Alert & Oriented X3, Moves all extremities, Sensation to light touch intact  CHEST/LUNG: Clear to auscultation bilaterally; No wheezes, rales or rhonchi  HEART: RRR, S1, S2; No murmurs, rubs, or gallops  ABDOMEN: Soft, Nontender, Nondistended; Bowel sounds present  EXTREMITIES:  2+ Peripheral Pulses, No clubbing, cyanosis, or edema  LYMPH: No lymphadenopathy noted  SKIN: No rashes or lesions    Care Discussed with Consultants/Other Providers [x] YES  [ ] NO Initial Hospitalist Consult Note  Department of Medicine at [facility]    Patient is a 45y old  Male who presents with a chief complaint of C4-6 ACDF (10 Mar 2025 10:36)    HPI:  Orthopaedic Surgery History and Physical  [place]celandic intepreter: [id number]    Patient is a 45y community-ambulator without assistive devices who presents to [place] [facility] w/ a c/o of neck pain. Patient states that neck pain began 10 days prior and has worsened today. Patient states that he does not recal any truama or injuries that could have  caused this. This is the first event of the neck pain per the patient. Patient states that he has numbness and tingling of the bilateral upper extremities, especially when laying down or moving the extremities. Patient states that his pain is limited to the neck and does not radiate down his arms. Patient denies any saddle anesthesia, or bowel/bladder incontinence. Patient is able to ambulate without assistance and able to perform all activities of daily  living. Patient states that the medicine given by the ED has improved his symptoms. Denies any other orthopedic concerns at this time. Patient states that he has no medical history or take any medications, however has not seen a doctor in many years.    Interval History: Pt seen and evaluated at bedside for pre-op clearance. States he had a "semi stroke" 3.5 years ago after hitting his head at work but was discharged the same day. Otherwise, denies any history of cardiac conditions including aortic stenosis, afib, CAD, MI, PPM. Denies any respiratory/lung disease. Denies any current CV symptoms including chest pain, claudication, dyspnea on exertion, orthopnea, and palpitations.       PAST MEDICAL & SURGICAL HISTORY:  Herniated cervical disc      S/P shoulder surgery  R shoulder        [] No significant past history as reviewed with the patient and family    FAMILY HISTORY:    [] Family history not pertinent as reviewed with the patient and family    SOCIAL HISTORY:    MEDICATIONS  (STANDING):  sodium chloride 0.9%. 1000 milliLiter(s) (125 mL/Hr) IV Continuous <Continuous>    MEDICATIONS  (PRN):    Allergies    No Known Allergies    Intolerances          IMAGING STUDIES:    < from: CT Cervical Spine No Cont (03.10.25 @ 09:51) >  The atlantodental interval is within normal limits. The lateral masses   are properly aligned. No facet subluxation or malalignment at the   craniovertebral or atlantoaxial articulations. No dens fracture. No   significant prevertebral soft tissue swelling.    Straightening of the cervical lordosis. Normal alignment of the cervical   vertebrae. Vertebral body height is well-maintained. Reduced   intervertebral disc height. No jumped or perched facets. No acute osseous   fracture.    No large disc bulge. The bilateral neuroforamina are largely patent. No   gross evidence of critical narrowing of the spinal canal.    The paraspinal muscles are unremarkable.    Visualized portions of the thyroid are unremarkable.    The visualized lung apices are within normal limits.    < end of copied text >      I&O's Summary      PAST MEDICAL & SURGICAL HISTORY:  Herniated cervical disc      S/P shoulder surgery  R shoulder          FAMILY HISTORY:      Allergies    No Known Allergies    Intolerances        SOCIAL HISTORY  Alcohol:  Tobacco:  Illicit substance use: denies    Home Medications:  acetaZOLAMIDE 250 mg oral tablet: 2 tab(s) orally 2 times a day (01 Sep 2021 11:45)  ocular lubricant ophthalmic solution: 1 drop(s) to each affected eye once a day (01 Sep 2021 11:25)  Tylenol 325 mg oral tablet: 2 tab(s) orally every 6 hours, As needed, Mild Pain (1 - 3) (25 Aug 2021 15:06)        LABS:                        15.3   3.74  )-----------( 263      ( 10 Mar 2025 11:00 )             46.7     03-10    139  |  105  |  14  ----------------------------<  94  4.5   |  27  |  0.94    Ca    9.3      10 Mar 2025 11:00      PT/INR - ( 10 Mar 2025 11:00 )   PT: 11.5 sec;   INR: 0.97 ratio         PTT - ( 10 Mar 2025 11:00 )  PTT:33.1 sec  Urinalysis Basic - ( 10 Mar 2025 11:00 )    Color: x / Appearance: x / SG: x / pH: x  Gluc: 94 mg/dL / Ketone: x  / Bili: x / Urobili: x   Blood: x / Protein: x / Nitrite: x   Leuk Esterase: x / RBC: x / WBC x   Sq Epi: x / Non Sq Epi: x / Bacteria: x      CAPILLARY BLOOD GLUCOSE            Urinalysis Basic - ( 10 Mar 2025 11:00 )    Color: x / Appearance: x / SG: x / pH: x  Gluc: 94 mg/dL / Ketone: x  / Bili: x / Urobili: x   Blood: x / Protein: x / Nitrite: x   Leuk Esterase: x / RBC: x / WBC x   Sq Epi: x / Non Sq Epi: x / Bacteria: x        MEDICATIONS  (STANDING):  multivitamin 1 Tablet(s) Oral daily  sodium chloride 0.9%. 1000 milliLiter(s) (125 mL/Hr) IV Continuous <Continuous>    MEDICATIONS  (PRN):  cyclobenzaprine 5 milliGRAM(s) Oral three times a day PRN Muscle Spasm  magnesium hydroxide Suspension 30 milliLiter(s) Oral daily PRN Constipation  oxyCODONE    IR 10 milliGRAM(s) Oral every 4 hours PRN Severe Pain (7 - 10)  oxyCODONE    IR 5 milliGRAM(s) Oral every 4 hours PRN Moderate Pain (4 - 6)  traMADol 50 milliGRAM(s) Oral every 6 hours PRN Mild Pain (1 - 3)      REVIEW OF SYSTEMS:  CONSTITUTIONAL: denies fever, chills, fatigue, weakness  HEENT: (+) neck pain   SKIN: denies new lesions, rash  CARDIOVASCULAR: denies chest pain, chest pressure, palpitations  RESPIRATORY: denies shortness of breath, sputum production  GASTROINTESTINAL: denies nausea, vomiting, diarrhea, abdominal pain  GENITOURINARY: denies dysuria, discharge  NEUROLOGICAL: denies numbness, headache, focal weakness  MUSCULOSKELETAL: denies new joint pain, muscle aches  HEMATOLOGIC: denies gross bleeding, bruising  LYMPHATICS: denies enlarged lymph nodes, extremity swelling  PSYCHIATRIC: denies recent changes in anxiety, depression  ENDOCRINOLOGIC: denies sweating, cold or heat intolerance    RADIOLOGY & ADDITIONAL TESTS:    EKG:    Imaging Personally Reviewed:  [x] YES  [ ] NO    EKG Personally Reviewed:  [x] YES  [ ] NO    Consultant(s) Notes Reviewed:  [x] YES  [ ] NO        PHYSICAL EXAM:  T(F): 97.5 (03-10-25 @ 14:01), Max: 98 (03-10-25 @ 08:44)  HR: 74 (03-10-25 @ 14:01) (70 - 78)  BP: 120/75 (03-10-25 @ 14:01) (113/73 - 145/84)  RR: 17 (03-10-25 @ 14:01) (16 - 17)  SpO2: 98% (03-10-25 @ 14:01) (98% - 100%)    GENERAL: NAD, well-groomed, well-developed  HEAD:  Atraumatic, Normocephalic  EYES: EOMI, PERRL, conjunctiva and sclera clear  ENMT: No tonsillar erythema, exudates, or enlargement; Moist mucous membranes  NECK: Supple, No JVD, Normal thyroid, TTP Cervical spine 4-6  NERVOUS SYSTEM:  Alert & Oriented X3, Moves all extremities, Sensation to light touch intact  CHEST/LUNG: Clear to auscultation bilaterally; No wheezes, rales or rhonchi  HEART: RRR, S1, S2; No murmurs, rubs, or gallops  ABDOMEN: Soft, Nontender, Nondistended; Bowel sounds present  EXTREMITIES:  2+ Peripheral Pulses, No clubbing, cyanosis, or edema  LYMPH: No lymphadenopathy noted  SKIN: No rashes or lesions    Care Discussed with Consultants/Other Providers [x] YES  [ ] NO Initial Hospitalist Consult Note  Department of Medicine at [facility]    Patient is a 45y old  Male who presents with a chief complaint of C4-6 ACDF (10 Mar 2025 10:36)    HPI:  Orthopaedic Surgery History and Physical  Kinyarwanda intepreter: [id number]    Patient is a 45y community-ambulator without assistive devices who presents to [place] ED w/ a c/o of neck pain. Patient states that neck pain began 10 days prior and has worsened today. Patient states that he does not recal any truama or injuries that could have  caused this. This is the first event of the neck pain per the patient. Patient states that he has numbness and tingling of the bilateral upper extremities, especially when laying down or moving the extremities. Patient states that his pain is limited to the neck and does not radiate down his arms. Patient denies any saddle anesthesia, or bowel/bladder incontinence. Patient is able to ambulate without assistance and able to perform all activities of daily  living. Patient states that the medicine given by the ED has improved his symptoms. Denies any other orthopedic concerns at this time. Patient states that he has no medical history or take any medications, however has not seen a doctor in many years.    Interval History: Pt seen and evaluated at bedside for pre-op clearance. States he had a "semi stroke" 3.5 years ago after hitting his head at work but was discharged the same day. Otherwise, denies any history of cardiac conditions including aortic stenosis, afib, CAD, MI, PPM. Denies any respiratory/lung disease. Denies any current CV symptoms including chest pain, claudication, dyspnea on exertion, orthopnea, and palpitations.       PAST MEDICAL & SURGICAL HISTORY:  Herniated cervical disc      S/P shoulder surgery  R shoulder        [] No significant past history as reviewed with the patient and family    FAMILY HISTORY:    [] Family history not pertinent as reviewed with the patient and family    SOCIAL HISTORY:    MEDICATIONS  (STANDING):  sodium chloride 0.9%. 1000 milliLiter(s) (125 mL/Hr) IV Continuous <Continuous>    MEDICATIONS  (PRN):    Allergies    No Known Allergies    Intolerances          IMAGING STUDIES:    < from: CT Cervical Spine No Cont (03.10.25 @ 09:51) >  The atlantodental interval is within normal limits. The lateral masses   are properly aligned. No facet subluxation or malalignment at the   craniovertebral or atlantoaxial articulations. No dens fracture. No   significant prevertebral soft tissue swelling.    Straightening of the cervical lordosis. Normal alignment of the cervical   vertebrae. Vertebral body height is well-maintained. Reduced   intervertebral disc height. No jumped or perched facets. No acute osseous   fracture.    No large disc bulge. The bilateral neuroforamina are largely patent. No   gross evidence of critical narrowing of the spinal canal.    The paraspinal muscles are unremarkable.    Visualized portions of the thyroid are unremarkable.    The visualized lung apices are within normal limits.    < end of copied text >      I&O's Summary      PAST MEDICAL & SURGICAL HISTORY:  Herniated cervical disc      S/P shoulder surgery  R shoulder          FAMILY HISTORY:      Allergies    No Known Allergies    Intolerances        SOCIAL HISTORY  Alcohol:  Tobacco:  Illicit substance use: denies    Home Medications:  acetaZOLAMIDE 250 mg oral tablet: 2 tab(s) orally 2 times a day (01 Sep 2021 11:45)  ocular lubricant ophthalmic solution: 1 drop(s) to each affected eye once a day (01 Sep 2021 11:25)  Tylenol 325 mg oral tablet: 2 tab(s) orally every 6 hours, As needed, Mild Pain (1 - 3) (25 Aug 2021 15:06)        LABS:                        15.3   3.74  )-----------( 263      ( 10 Mar 2025 11:00 )             46.7     03-10    139  |  105  |  14  ----------------------------<  94  4.5   |  27  |  0.94    Ca    9.3      10 Mar 2025 11:00      PT/INR - ( 10 Mar 2025 11:00 )   PT: 11.5 sec;   INR: 0.97 ratio         PTT - ( 10 Mar 2025 11:00 )  PTT:33.1 sec  Urinalysis Basic - ( 10 Mar 2025 11:00 )    Color: x / Appearance: x / SG: x / pH: x  Gluc: 94 mg/dL / Ketone: x  / Bili: x / Urobili: x   Blood: x / Protein: x / Nitrite: x   Leuk Esterase: x / RBC: x / WBC x   Sq Epi: x / Non Sq Epi: x / Bacteria: x      CAPILLARY BLOOD GLUCOSE            Urinalysis Basic - ( 10 Mar 2025 11:00 )    Color: x / Appearance: x / SG: x / pH: x  Gluc: 94 mg/dL / Ketone: x  / Bili: x / Urobili: x   Blood: x / Protein: x / Nitrite: x   Leuk Esterase: x / RBC: x / WBC x   Sq Epi: x / Non Sq Epi: x / Bacteria: x        MEDICATIONS  (STANDING):  multivitamin 1 Tablet(s) Oral daily  sodium chloride 0.9%. 1000 milliLiter(s) (125 mL/Hr) IV Continuous <Continuous>    MEDICATIONS  (PRN):  cyclobenzaprine 5 milliGRAM(s) Oral three times a day PRN Muscle Spasm  magnesium hydroxide Suspension 30 milliLiter(s) Oral daily PRN Constipation  oxyCODONE    IR 10 milliGRAM(s) Oral every 4 hours PRN Severe Pain (7 - 10)  oxyCODONE    IR 5 milliGRAM(s) Oral every 4 hours PRN Moderate Pain (4 - 6)  traMADol 50 milliGRAM(s) Oral every 6 hours PRN Mild Pain (1 - 3)      REVIEW OF SYSTEMS:  CONSTITUTIONAL: denies fever, chills, fatigue, weakness  HEENT: (+) neck pain   SKIN: denies new lesions, rash  CARDIOVASCULAR: denies chest pain, chest pressure, palpitations  RESPIRATORY: denies shortness of breath, sputum production  GASTROINTESTINAL: denies nausea, vomiting, diarrhea, abdominal pain  GENITOURINARY: denies dysuria, discharge  NEUROLOGICAL: denies numbness, headache, focal weakness  MUSCULOSKELETAL: denies new joint pain, muscle aches  HEMATOLOGIC: denies gross bleeding, bruising  LYMPHATICS: denies enlarged lymph nodes, extremity swelling  PSYCHIATRIC: denies recent changes in anxiety, depression  ENDOCRINOLOGIC: denies sweating, cold or heat intolerance    RADIOLOGY & ADDITIONAL TESTS:    EKG: sinus raquel    Imaging Personally Reviewed:  [x] YES  [ ] NO    EKG Personally Reviewed:  [x] YES  [ ] NO    Consultant(s) Notes Reviewed:  [x] YES  [ ] NO        PHYSICAL EXAM:  T(F): 97.5 (03-10-25 @ 14:01), Max: 98 (03-10-25 @ 08:44)  HR: 74 (03-10-25 @ 14:01) (70 - 78)  BP: 120/75 (03-10-25 @ 14:01) (113/73 - 145/84)  RR: 17 (03-10-25 @ 14:01) (16 - 17)  SpO2: 98% (03-10-25 @ 14:01) (98% - 100%)    GENERAL: NAD, well-groomed, well-developed  HEAD:  Atraumatic, Normocephalic  EYES: EOMI, PERRL, conjunctiva and sclera clear  ENMT: No tonsillar erythema, exudates, or enlargement; Moist mucous membranes  NECK: Supple, No JVD, Normal thyroid, TTP Cervical spine 4-6  NERVOUS SYSTEM:  Alert & Oriented X3, Moves all extremities, Sensation to light touch intact  CHEST/LUNG: Clear to auscultation bilaterally; No wheezes, rales or rhonchi  HEART: RRR, S1, S2; No murmurs, rubs, or gallops  ABDOMEN: Soft, Nontender, Nondistended; Bowel sounds present  EXTREMITIES:  2+ Peripheral Pulses, No clubbing, cyanosis, or edema  LYMPH: No lymphadenopathy noted  SKIN: No rashes or lesions    Care Discussed with Consultants/Other Providers [x] YES  [ ] NO

## 2025-03-10 NOTE — ED ADULT NURSE REASSESSMENT NOTE - NS ED NURSE REASSESS COMMENT FT1
1520:  per ortho, activity order in.  patient will be leaving unit.  report was given.  shoaib coronado.

## 2025-03-10 NOTE — ED PROVIDER NOTE - OBJECTIVE STATEMENT
Patient is a 45-year-old  male with herniated cervical disc years ago presenting now to the emergency department at [facility] complaining of neck pain posterior continuous for the past 10 days.  Pain is nonradiating located and localized to posterior cervical spine no weakness in upper extremities no paresthesias in upper extremities.  No chest pain no shortness of breath no cough no sore throat.

## 2025-03-10 NOTE — CARE COORDINATION ASSESSMENT. - NSCAREPROVIDERS_GEN_ALL_CORE_FT
CARE PROVIDERS:  Accepting Physician: Bebe Banerjee  Administration: Joan Aguirre  Administration: Kai Lee  Admitting: Bebe Banerjee  Attending: Bebe Banerjee  Consultant: Melecio Mackey  Consultant: Higinio Karimi  Covering Nurse: Jill Sweet  Covering Nurse: Brown Fletcher  Covering Team: Caesar Camejo  ED Attending: Davide Martinez  ED Nurse: Jessica Cartagena  Nurse: Alessandra Dominique  Nurse: Ksenia Onofre  Ordered: Doctor, Unknown  Override: Lala Griffin  PCA/Nursing Assistant: Alejandra Clifton  Pharmacist: Yohana Tang  Primary Team: Priti Michaud  Primary Team: Boogie Garcia  : Tamar Bowen  UR// Supp. Assoc.: Felicita Payne  UR// Supp. Assoc.: Soraya Rubi  UR// Supp. Assoc.: Tammy Pimentel   CARE PROVIDERS:  Accepting Physician: Bebe Banerjee  Administration: Joan Aguirre  Administration: Kai Lee  Admitting: Bebe Banerjee  Attending: Bebe Banerjee  Consultant: Melecio Mackey  Consultant: Higinio Karimi  Covering Nurse: Jill Sweet  Covering Nurse: Brown Fletcher  Covering Team: Caesar Camejo  ED Attending: Davide Martinez  ED Nurse: Jessica Cartagena  Nurse: Lala Griffin  Nurse: Alessandra Dominique  Nurse: Ksenia Onofre  Ordered: Doctor, Unknown  Override: Lala Griffin  PCA/Nursing Assistant: Alejandra Clifton  Pharmacist: Yohana Tang  Primary Team: Priti Michaud  Primary Team: Boogie Garcia  : Tamar Bowen  UR// Supp. Assoc.: Soraya Rubi  UR// Supp. Assoc.: Felicita Payne  UR// Supp. Assoc.: Tammy Pimentel

## 2025-03-10 NOTE — ED PROVIDER NOTE - CLINICAL SUMMARY MEDICAL DECISION MAKING FREE TEXT BOX
Young  male with 10 days of neck pain with prior history of cervical radiculopathy here now requiring thorough evaluation to be performed in an independent manner along with cervical spine CT imaging and medications.

## 2025-03-10 NOTE — CARE COORDINATION ASSESSMENT. - OTHER PERTINENT REFERRAL INFORMATION
Sw met with pt at bedside. Pt is A & O x4 and able to make his needs known. Sw introduced self and role and pt expressed verbal understanding. Pt and family live in a pvt apt with a full flight of stairs. Pt was admitted for c4-6 ACDF irene. Pt was Indep with ADLS pta with no hx of DME, ROSALBA or HC. Pt has not been to a provider in many years for an annual check up. Pt would like to return home after his surgery.

## 2025-03-10 NOTE — H&P ADULT - ATTENDING COMMENTS
I personally saw and examined patient.  patient with severe neck pain on top of his chronic numbness and tingling.  Has weakness 4/5 in RUE in deltoid and biceps and to lesser degree in left.   Reviewed his CT and MRI findings.  Patient due to significant chronic numbness and tingling and also now pain that is not tolerable without requiring IV pain medication, we offered patient cervical decompression at C5-6 and C4-5 which are the most effected levels and cause of neurologic findings.  Informed consent obtained.  Patient is aware he has mild disease at other levels but we would not recommend surgery there to preserve his neck mobility and function.  Goals are to address the most significant level.  His age is also kept into consideration.

## 2025-03-10 NOTE — ED ADULT NURSE REASSESSMENT NOTE - NS ED NURSE REASSESS COMMENT FT1
1437:  unit on the floor requesting report.  this was given.  Per Sarah LEE, activity order still needs to be placed.  will call ortho and have them place order  shoaib coronado.

## 2025-03-10 NOTE — ED ADULT NURSE NOTE - OBJECTIVE STATEMENT
presents to the er with complaints of neck pain for 10 days.  He denies any allergies.  patient medicated for pain and was taken via wheelchair to CT scan.  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   additional history will be obtained once he returns thru . presents to the er with complaints of neck pain for 10 days.  He denies any injury or trauma.  He denies any allergies.  He states that he did not see anyone for the pain.  He decided to come in for evaluation and help with his pain.  the patient medicated for pain and was taken via wheelchair to CT scan.  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   additional history will be obtained once he returns thru ..  Addendum:  once the patient returned from CT, there were orders from Ortho in chart.  I again assessed / interviewed the patient.  At first, he repeated what he said, that he has had neck pain for 10 days, no history of any injury.  When I asked him if he saw an orthopedist for his pain, he stated now, but after some further digging, he stated that he did have an MRI 4-5 months ago of his neck for pain, but the pain improved and he never went back to this MD.  The pain started again during the last 10  days and so he decided to come in.  He was not aware that he was being admitted or that he was having surgery.  All of this was explained to him with verbal understanding.  IV access placed to LAC #20g, presents to the er with complaints of neck pain for 10 days.  He denies any injury or trauma.  He denies any allergies.  He states that he did not see anyone for the pain.  He decided to come in for evaluation and help with his pain.  the patient medicated for pain and was taken via wheelchair to CT scan.  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   additional history will be obtained once he returns thru ..  Addendum:  once the patient returned from CT, there were orders from Ortho in chart.  I again assessed / interviewed the patient.  At first, he repeated what he said, that he has had neck pain for 10 days, no history of any injury.  When I asked him if he saw an orthopedist for his pain, he stated now, but after some further digging, he stated that he did have an MRI 4-5 months ago of his neck for pain, but the pain improved and he never went back to this MD.  The pain started again during the last 10  days and so he decided to come in.  He was not aware that he was being admitted or that he was having surgery.  All of this was explained to him with verbal understanding.  IV access placed to LAC #20g,   History obtained via  ID # [id number]

## 2025-03-10 NOTE — ED PROVIDER NOTE - PROGRESS NOTE DETAILS
Patient seen and evaluated by orthopedic service as they were aware though patient did not give information that he actually was evaluated by an orthopedist in the community and sent here for admission for surgery tomorrow.

## 2025-03-10 NOTE — CONSULT NOTE ADULT - ASSESSMENT
45 year old male presents to [facility] ED for c/o neck pain beginning 10 days ago with worsening symptoms today including numbness and tingling of b/l UE.     Pre-Op Clearance:   Somali speaker ( Idania [id number])  - Reports having a "semi stroke" 3.5 years ago after hitting his head at work but was discharged the same day, most likely TIA.  - Denies any history of cardiac conditions including aortic stenosis, afib, CAD, MI, PPM.   - Denies any respiratory/lung disease.  - Denies any current CV symptoms including chest pain, claudication, dyspnea on exertion, orthopnea, and palpitations.   - METS: 9.89, RCRI 1   - Pt has no active ischemia, decompensated heart failure, unstable arrythmia, or severe stenotic valvular disease, and has 0 cardiac risk factors and no modifiable risk factors. In the setting of intermediate risk ACDF, he is optimized from medical standpoint to proceed with planned procedure with routine hemodynamic monitoring.                    45 year old male presents to [facility] ED for c/o neck pain beginning 10 days ago with worsening symptoms today including numbness and tingling of b/l UE.     Pre-Op Clearance:   Martiniquais speaker ( Idania [id number])  - Reports having a "semi stroke" 3.5 years ago after hitting his head at work but was discharged the same day, most likely TIA.  - Denies any history of cardiac conditions including aortic stenosis, afib, CAD, MI, PPM.   - Denies any respiratory/lung disease.  - Denies any current CV symptoms including chest pain, claudication, dyspnea on exertion, orthopnea, and palpitations.   - METS: 9.89, RCRI 1   - Pt has no active ischemia, decompensated heart failure, unstable arrythmia, or severe stenotic valvular disease, and has  no modifiable risk factors. In the setting of intermediate risk ACDF, he is optimized from medical standpoint to proceed with planned procedure with routine hemodynamic monitoring.     dvt ppx: SCD's

## 2025-03-10 NOTE — H&P ADULT - HISTORY OF PRESENT ILLNESS
Orthopaedic Surgery History and Physical  Arabic intepreter: [id number]    Patient is a 45y community-ambulator without assistive devices who presents [facility] w/ a c/o of neck pain. Patient states that neck pain began 10 days prior and has worsened today. Patient states that he does not recal any truama or injuries that could have  caused this. This is the first event of the neck pain per the patient. Patient states that he has numbness and tingling of the bilateral upper extremities, especially when laying down or moving the extremities. Patient states that his pain is limited to the neck and does not radiate down his arms. Patient denies any saddle anesthesia, or bowel/bladder incontinence. Patient is able to ambulate without assistance and able to perform all activities of daily  living. Patient states that the medicine given by the ED has improved his symptoms. Denies any other orthopedic concerns at this time. Patient states that he has no medical history or take any medications, however has not seen a doctor in many years.    PAST MEDICAL & SURGICAL HISTORY:  Herniated cervical disc      S/P shoulder surgery  R shoulder        [] No significant past history as reviewed with the patient and family    FAMILY HISTORY:    [] Family history not pertinent as reviewed with the patient and family    SOCIAL HISTORY:    MEDICATIONS  (STANDING):  sodium chloride 0.9%. 1000 milliLiter(s) (125 mL/Hr) IV Continuous <Continuous>    MEDICATIONS  (PRN):    Allergies    No Known Allergies    Intolerances        REVIEW OF SYSTEMS  ROS as noted in HPI.    Vital Signs Last 24 Hrs  T(C): 36.6 (10 Mar 2025 10:08), Max: 36.7 (10 Mar 2025 08:44)  T(F): 97.8 (10 Mar 2025 10:08), Max: 98 (10 Mar 2025 08:44)  HR: 70 (10 Mar 2025 10:08) (70 - 78)  BP: 113/73 (10 Mar 2025 10:08) (113/73 - 145/84)  BP(mean): --  RR: 16 (10 Mar 2025 10:08) (16 - 16)  SpO2: 99% (10 Mar 2025 10:08) (99% - 100%)    Parameters below as of 10 Mar 2025 10:08  Patient On (Oxygen Delivery Method): room air          PHYSICAL EXAM:  Gen: lying comfortably, in NAD  TTP cervical spine  Motor:                   C5                C6              C7               C8           T1   R            5/5                5/5            5/5             5/5          5/5  L             5/5               5/5             5/5             5/5          5/5                L2             L3             L4               L5            S1  R         5/5           5/5          5/5             5/5           5/5  L          5/5          5/5           5/5             5/5           5/5    Sensory:            C5         C6         C7      C8       T1        (0=absent, 1=impaired, 2=normal, NT=not testable)  R         2            2           2        2         2  L          2            2           2        2         2               L2          L3         L4      L5       S1         (0=absent, 1=impaired, 2=normal, NT=not testable)  R         2            2            2        2        2  L          2            2           2        2         2    Negative Wylie, Babinski, and clonus bilaterally  Radial 2+ bilaterally  DP 2+ bilaterally   Able to open and close bilateral hands repeatedly with no issues                    IMAGING STUDIES:    < from: CT Cervical Spine No Cont (03.10.25 @ 09:51) >  The atlantodental interval is within normal limits. The lateral masses   are properly aligned. No facet subluxation or malalignment at the   craniovertebral or atlantoaxial articulations. No dens fracture. No   significant prevertebral soft tissue swelling.    Straightening of the cervical lordosis. Normal alignment of the cervical   vertebrae. Vertebral body height is well-maintained. Reduced   intervertebral disc height. No jumped or perched facets. No acute osseous   fracture.    No large disc bulge. The bilateral neuroforamina are largely patent. No   gross evidence of critical narrowing of the spinal canal.    The paraspinal muscles are unremarkable.    Visualized portions of the thyroid are unremarkable.    The visualized lung apices are within normal limits.    < end of copied text >      ASSESSMENT  45y Male with cervical spine radiculopathy    PLAN  - Admit to orthopaedic surgery for C4-6 ACDF tomorrow with Dr. Banerjee  - WBAT  - DVT ppx: Hold chemical ppx, SCDs okay  - Preop labs AM  - NPO at midnight except for meds  - Please medically optimize and document prior to surgery  - Medical comanagement appreciated  - Pain control as needed    Discussed with Dr. Banerjee and will update with any further recommednations Orthopaedic Surgery History and Physical  Polish intepreter: [id number]    Patient is a 45y community-ambulator without assistive devices who presents to [place] ED w/ a c/o of neck pain. Patient states that neck pain began 10 days prior and has worsened today. Patient states that he does not recall any new trauma or injuries that could have  caused this. Patient states that he has numbness and tingling of the bilateral upper extremities, especially when laying down or moving the extremities. Patient states that his pain is limited to the neck and numbness and tingling has been present > 1 year. Patient denies any saddle anesthesia, or bowel/bladder incontinence. Patient is able to ambulate without assistance and able to perform all activities of daily  living. Denies any other orthopedic concerns at this time. Patient states that he has no medical history or take any medications, however has not seen a doctor in many years.    PAST MEDICAL & SURGICAL HISTORY:  Herniated cervical disc      S/P shoulder surgery  R shoulder        [] No significant past history as reviewed with the patient and family    FAMILY HISTORY:    [] Family history not pertinent as reviewed with the patient and family    SOCIAL HISTORY:    MEDICATIONS  (STANDING):  sodium chloride 0.9%. 1000 milliLiter(s) (125 mL/Hr) IV Continuous <Continuous>    MEDICATIONS  (PRN):    Allergies    No Known Allergies    Intolerances        REVIEW OF SYSTEMS  ROS as noted in HPI.    Vital Signs Last 24 Hrs  T(C): 36.6 (10 Mar 2025 10:08), Max: 36.7 (10 Mar 2025 08:44)  T(F): 97.8 (10 Mar 2025 10:08), Max: 98 (10 Mar 2025 08:44)  HR: 70 (10 Mar 2025 10:08) (70 - 78)  BP: 113/73 (10 Mar 2025 10:08) (113/73 - 145/84)  BP(mean): --  RR: 16 (10 Mar 2025 10:08) (16 - 16)  SpO2: 99% (10 Mar 2025 10:08) (99% - 100%)    Parameters below as of 10 Mar 2025 10:08  Patient On (Oxygen Delivery Method): room air          PHYSICAL EXAM:  Gen: lying comfortably, in NAD  TTP cervical spine  Motor:                   C5                C6              C7               C8           T1   R            4/5                4/5            5/5             5/5          5/5  L             5-/5               5-/5             5/5             5/5          5/5                L2             L3             L4               L5            S1  R         5/5           5/5          5/5             5/5           5/5  L          5/5          5/5           5/5             5/5           5/5    Sensory:            C5         C6         C7      C8       T1        (0=absent, 1=impaired, 2=normal, NT=not testable)  R         1            1           2        2         2  L          1            1           2        2         2               L2          L3         L4      L5       S1         (0=absent, 1=impaired, 2=normal, NT=not testable)  R         2            2            2        2        2  L          2            2           2        2         2    Negative Wylie, Babinski, and clonus bilaterally, (+) tremor noted in right upper extremity  Radial 2+ bilaterally  DP 2+ bilaterally   Able to open and close bilateral hands                    IMAGING STUDIES:    Cervical spine MRI: C3-4 disc herniation - without cord or nerve root compression  C4-5: disc herniation with right neural foraminal compression of C5 nerve root  C5-6: broad based disc herniation with contact to spinal cord and dorsal translation of cord.    CT scan: confirms lack of major degenerative changes, C5-6 disc has collapsed posteriorly likely due to disc herniation translating into spinal canal.      ASSESSMENT  45y Male with cervical spine radiculopathy, worsening symptoms, had chronic treatment for > 1 year.  Patient reports in addition to neurologic symptoms with numbness and tingling, he now also has severe neck pain    PLAN  - Admit to orthopaedic surgery for C4-6 ACDF tomorrow with Dr. Banerjee  - WBAT  - DVT ppx: Hold chemical ppx, SCDs okay  - Preop labs AM  - NPO at midnight except for meds  - Please medically optimize and document prior to surgery  - Medical comanagement appreciated  - Pain control as needed    Discussed with Dr. Banerjee and will update with any further recommendations

## 2025-03-10 NOTE — CARE COORDINATION ASSESSMENT. - NSPASTMEDSURGHISTORY_GEN_ALL_CORE_FT
PAST MEDICAL & SURGICAL HISTORY:  Herniated cervical disc      S/P shoulder surgery  R shoulder

## 2025-03-10 NOTE — ED ADULT NURSE NOTE - NSFALLUNIVINTERV_ED_ALL_ED
Bed/Stretcher in lowest position, wheels locked, appropriate side rails in place/Call bell, personal items and telephone in reach/Instruct patient to call for assistance before getting out of bed/chair/stretcher/Non-slip footwear applied when patient is off stretcher/[place] to call system/Physically safe environment - no spills, clutter or unnecessary equipment/Purposeful proactive rounding/Room/bathroom lighting operational, light cord in reach

## 2025-03-10 NOTE — ED PROVIDER NOTE - PHYSICAL EXAMINATION
Examination reveals a well-developed well-nourished  male in no acute distress HEENT normocephalic atraumatic pupils are equal round reactive to light and accommodation extraocular movements are intact neck is supple there is reproducible tenderness to palpation upper paracervical area bilaterally.  No cervical or submandibular adenopathy.  Lungs are clear heart regular rate and rhythm without any murmurs rubs gallops and neurologically alert orient x 4 without any focal neurologic deficits reflexes symmetrical bilateral upper extremities motor and sensory bilateral normal and symmetric upper and lower extremities.

## 2025-03-11 LAB
ANION GAP SERPL CALC-SCNC: 3 MMOL/L — LOW (ref 5–17)
ANION GAP SERPL CALC-SCNC: 4 MMOL/L — LOW (ref 5–17)
ANION GAP SERPL CALC-SCNC: 5 MMOL/L — SIGNIFICANT CHANGE UP (ref 5–17)
APTT BLD: 31.7 SEC — SIGNIFICANT CHANGE UP (ref 24.5–35.6)
BUN SERPL-MCNC: 14 MG/DL — SIGNIFICANT CHANGE UP (ref 7–23)
BUN SERPL-MCNC: 15 MG/DL — SIGNIFICANT CHANGE UP (ref 7–23)
BUN SERPL-MCNC: 15 MG/DL — SIGNIFICANT CHANGE UP (ref 7–23)
CALCIUM SERPL-MCNC: 8.8 MG/DL — SIGNIFICANT CHANGE UP (ref 8.5–10.1)
CALCIUM SERPL-MCNC: 8.8 MG/DL — SIGNIFICANT CHANGE UP (ref 8.5–10.1)
CALCIUM SERPL-MCNC: 9.1 MG/DL — SIGNIFICANT CHANGE UP (ref 8.5–10.1)
CHLORIDE SERPL-SCNC: 104 MMOL/L — SIGNIFICANT CHANGE UP (ref 96–108)
CHLORIDE SERPL-SCNC: 106 MMOL/L — SIGNIFICANT CHANGE UP (ref 96–108)
CHLORIDE SERPL-SCNC: 108 MMOL/L — SIGNIFICANT CHANGE UP (ref 96–108)
CO2 SERPL-SCNC: 26 MMOL/L — SIGNIFICANT CHANGE UP (ref 22–31)
CO2 SERPL-SCNC: 28 MMOL/L — SIGNIFICANT CHANGE UP (ref 22–31)
CO2 SERPL-SCNC: 30 MMOL/L — SIGNIFICANT CHANGE UP (ref 22–31)
CREAT SERPL-MCNC: 0.86 MG/DL — SIGNIFICANT CHANGE UP (ref 0.5–1.3)
CREAT SERPL-MCNC: 0.92 MG/DL — SIGNIFICANT CHANGE UP (ref 0.5–1.3)
CREAT SERPL-MCNC: 1 MG/DL — SIGNIFICANT CHANGE UP (ref 0.5–1.3)
EGFR: 103 ML/MIN/1.73M2 — SIGNIFICANT CHANGE UP
EGFR: 103 ML/MIN/1.73M2 — SIGNIFICANT CHANGE UP
EGFR: 107 ML/MIN/1.73M2 — SIGNIFICANT CHANGE UP
EGFR: 107 ML/MIN/1.73M2 — SIGNIFICANT CHANGE UP
EGFR: 93 ML/MIN/1.73M2 — SIGNIFICANT CHANGE UP
EGFR: 93 ML/MIN/1.73M2 — SIGNIFICANT CHANGE UP
GLUCOSE SERPL-MCNC: 141 MG/DL — HIGH (ref 70–99)
GLUCOSE SERPL-MCNC: 146 MG/DL — HIGH (ref 70–99)
GLUCOSE SERPL-MCNC: 95 MG/DL — SIGNIFICANT CHANGE UP (ref 70–99)
HCT VFR BLD CALC: 41 % — SIGNIFICANT CHANGE UP (ref 39–50)
HCT VFR BLD CALC: 42.5 % — SIGNIFICANT CHANGE UP (ref 39–50)
HGB BLD-MCNC: 13.9 G/DL — SIGNIFICANT CHANGE UP (ref 13–17)
HGB BLD-MCNC: 14.2 G/DL — SIGNIFICANT CHANGE UP (ref 13–17)
INR BLD: 1.02 RATIO — SIGNIFICANT CHANGE UP (ref 0.85–1.16)
MCHC RBC-ENTMCNC: 29.7 PG — SIGNIFICANT CHANGE UP (ref 27–34)
MCHC RBC-ENTMCNC: 29.8 PG — SIGNIFICANT CHANGE UP (ref 27–34)
MCHC RBC-ENTMCNC: 33.4 G/DL — SIGNIFICANT CHANGE UP (ref 32–36)
MCHC RBC-ENTMCNC: 33.9 G/DL — SIGNIFICANT CHANGE UP (ref 32–36)
MCV RBC AUTO: 87.6 FL — SIGNIFICANT CHANGE UP (ref 80–100)
MCV RBC AUTO: 89.1 FL — SIGNIFICANT CHANGE UP (ref 80–100)
NRBC BLD AUTO-RTO: 0 /100 WBCS — SIGNIFICANT CHANGE UP (ref 0–0)
NRBC BLD AUTO-RTO: 0 /100 WBCS — SIGNIFICANT CHANGE UP (ref 0–0)
PLATELET # BLD AUTO: 220 K/UL — SIGNIFICANT CHANGE UP (ref 150–400)
PLATELET # BLD AUTO: 229 K/UL — SIGNIFICANT CHANGE UP (ref 150–400)
POTASSIUM SERPL-MCNC: 3.7 MMOL/L — SIGNIFICANT CHANGE UP (ref 3.5–5.3)
POTASSIUM SERPL-MCNC: 4 MMOL/L — SIGNIFICANT CHANGE UP (ref 3.5–5.3)
POTASSIUM SERPL-MCNC: 4.2 MMOL/L — SIGNIFICANT CHANGE UP (ref 3.5–5.3)
POTASSIUM SERPL-SCNC: 3.7 MMOL/L — SIGNIFICANT CHANGE UP (ref 3.5–5.3)
POTASSIUM SERPL-SCNC: 4 MMOL/L — SIGNIFICANT CHANGE UP (ref 3.5–5.3)
POTASSIUM SERPL-SCNC: 4.2 MMOL/L — SIGNIFICANT CHANGE UP (ref 3.5–5.3)
PROTHROM AB SERPL-ACNC: 11.9 SEC — SIGNIFICANT CHANGE UP (ref 9.9–13.4)
RBC # BLD: 4.68 M/UL — SIGNIFICANT CHANGE UP (ref 4.2–5.8)
RBC # BLD: 4.77 M/UL — SIGNIFICANT CHANGE UP (ref 4.2–5.8)
RBC # FLD: 12.2 % — SIGNIFICANT CHANGE UP (ref 10.3–14.5)
RBC # FLD: 12.3 % — SIGNIFICANT CHANGE UP (ref 10.3–14.5)
SODIUM SERPL-SCNC: 136 MMOL/L — SIGNIFICANT CHANGE UP (ref 135–145)
SODIUM SERPL-SCNC: 137 MMOL/L — SIGNIFICANT CHANGE UP (ref 135–145)
SODIUM SERPL-SCNC: 141 MMOL/L — SIGNIFICANT CHANGE UP (ref 135–145)
WBC # BLD: 4.11 K/UL — SIGNIFICANT CHANGE UP (ref 3.8–10.5)
WBC # BLD: 6.56 K/UL — SIGNIFICANT CHANGE UP (ref 3.8–10.5)
WBC # FLD AUTO: 4.11 K/UL — SIGNIFICANT CHANGE UP (ref 3.8–10.5)
WBC # FLD AUTO: 6.56 K/UL — SIGNIFICANT CHANGE UP (ref 3.8–10.5)

## 2025-03-11 PROCEDURE — 99232 SBSQ HOSP IP/OBS MODERATE 35: CPT

## 2025-03-11 PROCEDURE — 99221 1ST HOSP IP/OBS SF/LOW 40: CPT | Mod: GC

## 2025-03-11 PROCEDURE — 88304 TISSUE EXAM BY PATHOLOGIST: CPT | Mod: 26

## 2025-03-11 DEVICE — IMPLANTABLE DEVICE: Type: IMPLANTABLE DEVICE | Status: FUNCTIONAL

## 2025-03-11 DEVICE — SURGIFLO MATRIX WITH THROMBIN KIT: Type: IMPLANTABLE DEVICE | Status: FUNCTIONAL

## 2025-03-11 DEVICE — GRAFT FIBERGRAFT PUTTY MED 4CC: Type: IMPLANTABLE DEVICE | Status: FUNCTIONAL

## 2025-03-11 DEVICE — BONE WAX 2.5GM: Type: IMPLANTABLE DEVICE | Status: FUNCTIONAL

## 2025-03-11 DEVICE — SCREW DISTRACTION 14MM: Type: IMPLANTABLE DEVICE | Status: FUNCTIONAL

## 2025-03-11 RX ORDER — SENNA 187 MG
2 TABLET ORAL AT BEDTIME
Refills: 0 | Status: DISCONTINUED | OUTPATIENT
Start: 2025-03-11 | End: 2025-03-12

## 2025-03-11 RX ORDER — ONDANSETRON HCL/PF 4 MG/2 ML
4 VIAL (ML) INJECTION EVERY 6 HOURS
Refills: 0 | Status: DISCONTINUED | OUTPATIENT
Start: 2025-03-11 | End: 2025-03-12

## 2025-03-11 RX ORDER — ACETAMINOPHEN 500 MG/5ML
1000 LIQUID (ML) ORAL ONCE
Refills: 0 | Status: COMPLETED | OUTPATIENT
Start: 2025-03-11 | End: 2025-03-11

## 2025-03-11 RX ORDER — OXYCODONE HYDROCHLORIDE 30 MG/1
10 TABLET ORAL EVERY 6 HOURS
Refills: 0 | Status: DISCONTINUED | OUTPATIENT
Start: 2025-03-11 | End: 2025-03-11

## 2025-03-11 RX ORDER — ONDANSETRON HCL/PF 4 MG/2 ML
4 VIAL (ML) INJECTION ONCE
Refills: 0 | Status: DISCONTINUED | OUTPATIENT
Start: 2025-03-11 | End: 2025-03-11

## 2025-03-11 RX ORDER — B1/B2/B3/B5/B6/B12/VIT C/FOLIC 500-0.5 MG
1 TABLET ORAL DAILY
Refills: 0 | Status: DISCONTINUED | OUTPATIENT
Start: 2025-03-11 | End: 2025-03-12

## 2025-03-11 RX ORDER — SODIUM CHLORIDE 9 G/1000ML
1000 INJECTION, SOLUTION INTRAVENOUS
Refills: 0 | Status: DISCONTINUED | OUTPATIENT
Start: 2025-03-11 | End: 2025-03-11

## 2025-03-11 RX ORDER — HYDROMORPHONE/SOD CHLOR,ISO/PF 2 MG/10 ML
0.5 SYRINGE (ML) INJECTION EVERY 4 HOURS
Refills: 0 | Status: DISCONTINUED | OUTPATIENT
Start: 2025-03-11 | End: 2025-03-12

## 2025-03-11 RX ORDER — POLYETHYLENE GLYCOL 3350 17 G/17G
17 POWDER, FOR SOLUTION ORAL AT BEDTIME
Refills: 0 | Status: DISCONTINUED | OUTPATIENT
Start: 2025-03-11 | End: 2025-03-12

## 2025-03-11 RX ORDER — CEFAZOLIN SODIUM IN 0.9 % NACL 3 G/100 ML
2000 INTRAVENOUS SOLUTION, PIGGYBACK (ML) INTRAVENOUS EVERY 8 HOURS
Refills: 0 | Status: DISCONTINUED | OUTPATIENT
Start: 2025-03-11 | End: 2025-03-12

## 2025-03-11 RX ORDER — CEFAZOLIN SODIUM IN 0.9 % NACL 3 G/100 ML
2000 INTRAVENOUS SOLUTION, PIGGYBACK (ML) INTRAVENOUS ONCE
Refills: 0 | Status: COMPLETED | OUTPATIENT
Start: 2025-03-11 | End: 2025-03-11

## 2025-03-11 RX ORDER — HYDROMORPHONE/SOD CHLOR,ISO/PF 2 MG/10 ML
0.5 SYRINGE (ML) INJECTION
Refills: 0 | Status: DISCONTINUED | OUTPATIENT
Start: 2025-03-11 | End: 2025-03-11

## 2025-03-11 RX ORDER — TRAMADOL HYDROCHLORIDE 50 MG/1
50 TABLET, FILM COATED ORAL EVERY 6 HOURS
Refills: 0 | Status: DISCONTINUED | OUTPATIENT
Start: 2025-03-11 | End: 2025-03-12

## 2025-03-11 RX ORDER — OXYCODONE HYDROCHLORIDE 30 MG/1
5 TABLET ORAL EVERY 6 HOURS
Refills: 0 | Status: DISCONTINUED | OUTPATIENT
Start: 2025-03-11 | End: 2025-03-11

## 2025-03-11 RX ORDER — MAGNESIUM HYDROXIDE 400 MG/5ML
30 SUSPENSION ORAL DAILY
Refills: 0 | Status: DISCONTINUED | OUTPATIENT
Start: 2025-03-11 | End: 2025-03-12

## 2025-03-11 RX ORDER — ACETAMINOPHEN 500 MG/5ML
975 LIQUID (ML) ORAL EVERY 8 HOURS
Refills: 0 | Status: DISCONTINUED | OUTPATIENT
Start: 2025-03-11 | End: 2025-03-12

## 2025-03-11 RX ORDER — CYCLOBENZAPRINE HYDROCHLORIDE 15 MG/1
5 CAPSULE, EXTENDED RELEASE ORAL THREE TIMES A DAY
Refills: 0 | Status: DISCONTINUED | OUTPATIENT
Start: 2025-03-11 | End: 2025-03-12

## 2025-03-11 RX ORDER — OXYCODONE HYDROCHLORIDE 30 MG/1
10 TABLET ORAL EVERY 6 HOURS
Refills: 0 | Status: DISCONTINUED | OUTPATIENT
Start: 2025-03-11 | End: 2025-03-12

## 2025-03-11 RX ORDER — MAGNESIUM, ALUMINUM HYDROXIDE 200-200 MG
30 TABLET,CHEWABLE ORAL
Refills: 0 | Status: DISCONTINUED | OUTPATIENT
Start: 2025-03-11 | End: 2025-03-12

## 2025-03-11 RX ADMIN — Medication 400 MILLIGRAM(S): at 16:56

## 2025-03-11 RX ADMIN — OXYCODONE HYDROCHLORIDE 5 MILLIGRAM(S): 30 TABLET ORAL at 18:01

## 2025-03-11 RX ADMIN — Medication 125 MILLILITER(S): at 01:16

## 2025-03-11 RX ADMIN — Medication 975 MILLIGRAM(S): at 23:06

## 2025-03-11 RX ADMIN — Medication 975 MILLIGRAM(S): at 22:21

## 2025-03-11 RX ADMIN — Medication 100 MILLIGRAM(S): at 22:32

## 2025-03-11 RX ADMIN — Medication 2 TABLET(S): at 22:21

## 2025-03-11 RX ADMIN — OXYCODONE HYDROCHLORIDE 10 MILLIGRAM(S): 30 TABLET ORAL at 19:33

## 2025-03-11 RX ADMIN — OXYCODONE HYDROCHLORIDE 10 MILLIGRAM(S): 30 TABLET ORAL at 20:20

## 2025-03-11 RX ADMIN — Medication 0.5 MILLIGRAM(S): at 13:00

## 2025-03-11 RX ADMIN — Medication 1000 MILLIGRAM(S): at 17:00

## 2025-03-11 RX ADMIN — POLYETHYLENE GLYCOL 3350 17 GRAM(S): 17 POWDER, FOR SOLUTION ORAL at 22:21

## 2025-03-11 RX ADMIN — OXYCODONE HYDROCHLORIDE 5 MILLIGRAM(S): 30 TABLET ORAL at 17:11

## 2025-03-11 RX ADMIN — Medication 0.5 MILLIGRAM(S): at 14:31

## 2025-03-11 RX ADMIN — Medication 0.5 MILLIGRAM(S): at 15:10

## 2025-03-11 RX ADMIN — SODIUM CHLORIDE 75 MILLILITER(S): 9 INJECTION, SOLUTION INTRAVENOUS at 13:01

## 2025-03-11 NOTE — PROGRESS NOTE ADULT - SUBJECTIVE AND OBJECTIVE BOX
Pt seen and examined at bedside. No acute events overnight. Pain controlled, denies any new numbness or tingling. Denies nausea, vomiting, chest pain, or shortness of breath.         LABS:                        14.2   4.11  )-----------( 220      ( 11 Mar 2025 04:47 )             42.5     03-11    141  |  108  |  15  ----------------------------<  95  4.0   |  30  |  0.86    Ca    8.8      11 Mar 2025 04:47      PT/INR - ( 11 Mar 2025 04:47 )   PT: 11.9 sec;   INR: 1.02 ratio         PTT - ( 11 Mar 2025 04:47 )  PTT:31.7 sec  Urinalysis Basic - ( 11 Mar 2025 04:47 )    Color: x / Appearance: x / SG: x / pH: x  Gluc: 95 mg/dL / Ketone: x  / Bili: x / Urobili: x   Blood: x / Protein: x / Nitrite: x   Leuk Esterase: x / RBC: x / WBC x   Sq Epi: x / Non Sq Epi: x / Bacteria: x        VITAL SIGNS:  T(C): 36.4 (03-11-25 @ 04:58), Max: 36.7 (03-10-25 @ 08:44)  HR: 75 (03-11-25 @ 04:58) (66 - 78)  BP: 99/63 (03-11-25 @ 04:58) (99/63 - 145/84)  RR: 18 (03-11-25 @ 04:58) (16 - 18)  SpO2: 95% (03-11-25 @ 04:58) (95% - 100%)        EXAM:  Gen: lying comfortably, in NAD    Motor:                   C5                C6              C7               C8           T1   R            5/5                5/5            5/5             5/5          5/5  L             5/5               5/5             5/5             5/5          5/5                L2             L3             L4               L5            S1  R         5/5           5/5          5/5             5/5           5/5  L          5/5          5/5           5/5             5/5           5/5    Sensory:            C5         C6         C7      C8       T1        (0=absent, 1=impaired, 2=normal, NT=not testable)  R         2            2           2        2         2  L          2            2           2        2         2               L2          L3         L4      L5       S1         (0=absent, 1=impaired, 2=normal, NT=not testable)  R         2            2            2        2        2  L          2            2           2        2         2    Negative Wylie, Babinski, and clonus bilaterally  Radial 2+ bilaterally  DP 2+ bilaterally           ASSESSMENT  45y Male with cervical spine radiculopathy    PLAN  - OR for C4-6 ACDF today with Dr. Banerjee  - WBAT  - DVT ppx: Hold chemical ppx, SCDs okay  - Preop labs noted  - NPO except for meds  - Medical optimization noted  - Medical comanagement appreciated  - Pain control as needed    Discussed with Dr. Banerjee and will update with any further recommendations

## 2025-03-11 NOTE — CONSULT NOTE ADULT - ATTENDING COMMENTS
46 yo man POD 0 s/p ACDF C4-6, pain controlled, no signs of airway compromise.  No dyspnea, no stridor, pain controlled, tolerating clears without difficulty.  Continue airway monitoring  pain control  DVT ppx with SCDs only
45 year old male presents to [facility] ED for c/o neck pain beginning 10 days ago with worsening symptoms today including numbness and tingling of b/l UE.     HPI as above.     T(C): 36.6 (03-10-25 @ 15:30), Max: 36.7 (03-10-25 @ 08:44)  HR: 66 (03-10-25 @ 15:30) (66 - 78)  BP: 104/64 (03-10-25 @ 15:30) (104/64 - 145/84)  RR: 18 (03-10-25 @ 15:30) (16 - 18)  SpO2: 96% (03-10-25 @ 15:30) (96% - 100%)  Wt(kg): --    Physical Exam:   GENERAL: well-groomed, well-developed, NAD  HEENT: head NC/AT;  conjunctiva & sclera clear; hearing grossly intact, moist mucous membranes  NECK: supple, no JVD  RESPIRATORY: CTA B/L, no wheezing, rales, rhonchi or rubs  CARDIOVASCULAR: S1&S2, RRR, no murmurs or gallops  ABDOMEN: soft, non-tender, non-distended, + Bowel sounds x4 quadrants, no guarding, rebound or rigidity  MUSCULOSKELETAL:  no clubbing, cyanosis or edema of all 4 extremities  LYMPH: no cervical lymphadenopathy  VASCULAR: Radial pulses 2+ bilaterally, no varicose veins   SKIN: warm and dry, color normal  NEUROLOGIC: AA&O X3,     PLan:   Patient is medically optimized for cervical spine surgery if clinically indicated. Patient has no modifiable risk factors at this time. No evidence of heart failure on exam.   EKG sinus raquel at 59    remainder as above

## 2025-03-11 NOTE — CASE MANAGEMENT PROGRESS NOTE - NSCMPROGRESSNOTE_GEN_ALL_CORE
Discussed pt on rounds, pt remains acute, for C4-6 ACDF, on IV Ancef. CM will continue to collaborate with interdisciplinary team and remain available to assist.

## 2025-03-11 NOTE — OPERATIVE REPORT - NSICDXBRIEFPROCEDURE_GEN_ALL_CORE_FT
PROCEDURES:  Anterior cervical discectomy and fusion (ACDF) at 2 levels 11-Mar-2025 12:08:13  Subha Doehrty

## 2025-03-11 NOTE — PROGRESS NOTE ADULT - SUBJECTIVE AND OBJECTIVE BOX
Post Op Check    Pt seen and examined at bedside. No acute events since the surgery. Pain controlled, denies any numbness or tingling. Denies nausea, vomiting, chest pain, or shortness of breath. Denies any difficulty breathing or swallowing.        LABS:                        13.9   6.56  )-----------( 229      ( 11 Mar 2025 13:26 )             41.0     03-11    137  |  106  |  15  ----------------------------<  141[H]  3.7   |  26  |  0.92    Ca    8.8      11 Mar 2025 13:26      PT/INR - ( 11 Mar 2025 04:47 )   PT: 11.9 sec;   INR: 1.02 ratio         PTT - ( 11 Mar 2025 04:47 )  PTT:31.7 sec  Urinalysis Basic - ( 11 Mar 2025 13:26 )    Color: x / Appearance: x / SG: x / pH: x  Gluc: 141 mg/dL / Ketone: x  / Bili: x / Urobili: x   Blood: x / Protein: x / Nitrite: x   Leuk Esterase: x / RBC: x / WBC x   Sq Epi: x / Non Sq Epi: x / Bacteria: x        VITAL SIGNS:  T(C): 36.7 (03-11-25 @ 13:21), Max: 37.4 (03-11-25 @ 12:21)  HR: 100 (03-11-25 @ 13:36) (66 - 113)  BP: 106/78 (03-11-25 @ 13:36) (99/63 - 114/66)  RR: 16 (03-11-25 @ 13:36) (13 - 18)  SpO2: 98% (03-11-25 @ 13:36) (95% - 99%)        EXAM:  Gen: lying comfortably, in NAD    Motor:                   C5                C6              C7               C8           T1   R            5/5                5/5            5/5             5/5          5/5  L             5/5               5/5             5/5             5/5          5/5                L2             L3             L4               L5            S1  R         5/5           5/5          5/5             5/5           5/5  L          5/5          5/5           5/5             5/5           5/5    Sensory:            C5         C6         C7      C8       T1        (0=absent, 1=impaired, 2=normal, NT=not testable)  R         2            2           2        2         2  L          2            2           2        2         2               L2          L3         L4      L5       S1         (0=absent, 1=impaired, 2=normal, NT=not testable)  R         2            2            2        2        2  L          2            2           2        2         2    Negative Wylie, Babinski, and clonus bilaterally  Radial 2+ bilaterally  DP 2+ bilaterally           A/P:  45yMale sp C4-6 ACDF POD 0, doing well postoperatively    - WBAT, hard cervical collar to be worn while ambulating  - [place] J collar ordered  - PT/OT  - Pain control as needed  - DVT ppx: SCDs and ambulation  - JODEE drain present, please record drainage at the end of each shift in flowsheets  - Continuous ancef while JODEE in place  - Encourage incentive spirometry  - Dispo: pending PT recs and drain        Discussed with Dr. Banerjee and will update with any further recommendations

## 2025-03-11 NOTE — CONSULT NOTE ADULT - SUBJECTIVE AND OBJECTIVE BOX
45y M no significant PMH presenting to the ICU following C4-C6 fusion, no complications during surgical process. Following surgery, patient has no complaints or concerns. Denies SOB, difficulty breathing, and has full sensation and mobility of his extremities.     Review of Systems:  Constitutional: No fever, chills, fatigue, + neck pain  Neuro: No headache, numbness, weakness  Resp: No cough, wheezing, shortness of breath  CVS: No chest pain, palpitations, leg swelling  GI: No abdominal pain, nausea, vomiting, diarrhea   : No dysuria, frequency, incontinence  Skin: No itching, burning, rashes, or lesions   Msk: No joint pain or swelling  Psych: No depression, anxiety, mood swings    ICU Vital Signs Last 24 Hrs  T(C): 36.7 (11 Mar 2025 13:21), Max: 37.4 (11 Mar 2025 12:21)  T(F): 98.1 (11 Mar 2025 13:21), Max: 99.3 (11 Mar 2025 12:21)  HR: 100 (11 Mar 2025 13:36) (66 - 113)  BP: 106/78 (11 Mar 2025 13:36) (99/63 - 114/66)  BP(mean): --  ABP: --  ABP(mean): --  RR: 16 (11 Mar 2025 13:36) (13 - 18)  SpO2: 98% (11 Mar 2025 13:36) (95% - 99%)    O2 Parameters below as of 11 Mar 2025 04:58  Patient On (Oxygen Delivery Method): room air                CAPILLARY BLOOD GLUCOSE          I&O's Summary    10 Mar 2025 07:01  -  11 Mar 2025 07:00  --------------------------------------------------------  IN: 360 mL / OUT: 0 mL / NET: 360 mL        Physical Exam:   VITALS:   T(C): 36.7 (03-11-25 @ 13:21), Max: 37.4 (03-11-25 @ 12:21)  HR: 100 (03-11-25 @ 13:36) (66 - 113)  BP: 106/78 (03-11-25 @ 13:36) (99/63 - 114/66)  RR: 16 (03-11-25 @ 13:36) (13 - 18)  SpO2: 98% (03-11-25 @ 13:36) (95% - 99%)    GENERAL: NAD,  c- collar in place, JODEE drain in neck  HEAD:  Atraumatic  EYES: conjunctiva and sclera clear  ENT: Moist mucous membranes  NECK: No strider present on auscultation  HEART: Regular rate and rhythm, no murmurs, rubs, or gallops  LUNGS: Unlabored respirations.  Clear to auscultation bilaterally, no crackles, wheezing, or rhonchi  ABDOMEN: Soft, nontender, nondistended  EXTREMITIES: 2+ peripheral pulses bilaterally. No clubbing, cyanosis, or edema  NERVOUS SYSTEM:  A&Ox3, no focal deficits, sensation and motor UE and LE intact.   SKIN: No rashes or lesions    Meds:          HYDROmorphone  Injectable IV Push PRN  ondansetron Injectable IV Push PRN            lactated ringers. IV Continuous                                  13.9   6.56  )-----------( 229      ( 11 Mar 2025 13:26 )             41.0       03-11    137  |  106  |  15  ----------------------------<  141[H]  3.7   |  26  |  0.92    Ca    8.8      11 Mar 2025 13:26            PT/INR - ( 11 Mar 2025 04:47 )   PT: 11.9 sec;   INR: 1.02 ratio         PTT - ( 11 Mar 2025 04:47 )  PTT:31.7 sec  Urinalysis Basic - ( 11 Mar 2025 13:26 )    Color: x / Appearance: x / SG: x / pH: x  Gluc: 141 mg/dL / Ketone: x  / Bili: x / Urobili: x   Blood: x / Protein: x / Nitrite: x   Leuk Esterase: x / RBC: x / WBC x   Sq Epi: x / Non Sq Epi: x / Bacteria: x                  Radiology:    Bedside Ultrasound:    Tubes/Lines: JODEE drain, neck      GLOBAL ISSUE/BEST PRACTICE:  Analgesia: Y  Sedation: N  HOB elevation: Y  Stress ulcer prophylaxis: N  Glycemic control: N  Nutrition: Y    CODE STATUS:  FULL CODE       45y M no significant PMH presenting to the ICU following C4-C6 fusion, no complications during surgical process. Following surgery, patient has no complaints or concerns. Denies SOB, difficulty breathing, and has full sensation and mobility of his extremities.     Review of Systems:  Constitutional: No fever, chills, fatigue, + neck pain  Neuro: No headache, numbness, weakness  Resp: No cough, wheezing, shortness of breath  CVS: No chest pain, palpitations, leg swelling  GI: No abdominal pain, nausea, vomiting, diarrhea   : No dysuria, frequency, incontinence  Skin: No itching, burning, rashes, or lesions   Msk: No joint pain or swelling  Psych: No depression, anxiety, mood swings    ICU Vital Signs Last 24 Hrs  T(C): 36.7 (11 Mar 2025 13:21), Max: 37.4 (11 Mar 2025 12:21)  T(F): 98.1 (11 Mar 2025 13:21), Max: 99.3 (11 Mar 2025 12:21)  HR: 100 (11 Mar 2025 13:36) (66 - 113)  BP: 106/78 (11 Mar 2025 13:36) (99/63 - 114/66)  BP(mean): --  ABP: --  ABP(mean): --  RR: 16 (11 Mar 2025 13:36) (13 - 18)  SpO2: 98% (11 Mar 2025 13:36) (95% - 99%)    O2 Parameters below as of 11 Mar 2025 04:58  Patient On (Oxygen Delivery Method): room air                CAPILLARY BLOOD GLUCOSE          I&O's Summary    10 Mar 2025 07:01  -  11 Mar 2025 07:00  --------------------------------------------------------  IN: 360 mL / OUT: 0 mL / NET: 360 mL        Physical Exam:   VITALS:   T(C): 36.7 (03-11-25 @ 13:21), Max: 37.4 (03-11-25 @ 12:21)  HR: 100 (03-11-25 @ 13:36) (66 - 113)  BP: 106/78 (03-11-25 @ 13:36) (99/63 - 114/66)  RR: 16 (03-11-25 @ 13:36) (13 - 18)  SpO2: 98% (03-11-25 @ 13:36) (95% - 99%)    GENERAL: NAD,  c- collar in place, JODEE drain in neck  HEAD:  Atraumatic  EYES: conjunctiva and sclera clear  ENT: Moist mucous membranes  NECK: No strider present on auscultation  HEART: Regular rate and rhythm, no murmurs, rubs, or gallops  LUNGS: Unlabored respirations.  Clear to auscultation bilaterally, no crackles, wheezing, or rhonchi  ABDOMEN: Soft, nontender, nondistended  EXTREMITIES: 2+ peripheral pulses bilaterally. No clubbing, cyanosis, or edema  NERVOUS SYSTEM:  A&Ox3, no focal deficits, sensation and motor UE and LE intact.   SKIN: No rashes or lesions    Meds:          HYDROmorphone  Injectable IV Push PRN  ondansetron Injectable IV Push PRN            lactated ringers. IV Continuous                                  13.9   6.56  )-----------( 229      ( 11 Mar 2025 13:26 )             41.0       03-11    137  |  106  |  15  ----------------------------<  141[H]  3.7   |  26  |  0.92    Ca    8.8      11 Mar 2025 13:26            PT/INR - ( 11 Mar 2025 04:47 )   PT: 11.9 sec;   INR: 1.02 ratio         PTT - ( 11 Mar 2025 04:47 )  PTT:31.7 sec  Urinalysis Basic - ( 11 Mar 2025 13:26 )    Color: x / Appearance: x / SG: x / pH: x  Gluc: 141 mg/dL / Ketone: x  / Bili: x / Urobili: x   Blood: x / Protein: x / Nitrite: x   Leuk Esterase: x / RBC: x / WBC x   Sq Epi: x / Non Sq Epi: x / Bacteria: x        Tubes/Lines: JODEE drain, neck      GLOBAL ISSUE/BEST PRACTICE:  Analgesia: Y  Sedation: N  HOB elevation: Y  Stress ulcer prophylaxis: N  Glycemic control: N  Nutrition: Y    CODE STATUS:  FULL CODE

## 2025-03-11 NOTE — BRIEF OPERATIVE NOTE - NSICDXBRIEFPROCEDURE_GEN_ALL_CORE_FT
PROCEDURES:  Anterior cervical discectomy and fusion (ACDF) at 2 levels 11-Mar-2025 12:08:13  Subha Doherty

## 2025-03-11 NOTE — CHART NOTE - NSCHARTNOTEFT_GEN_A_CORE
Pt seen at bedside to measure and fit withy Floyd J collar to replace postop collar.  Pt sat upright and collar removed and replaced .  Pt was instructed to don and doff  Follow up if needed  Instructions provided for selam      [facility]  [phone] POLabs

## 2025-03-11 NOTE — DISCHARGE NOTE PROVIDER - NSDCFUADDINST_GEN_ALL_CORE_FT
1. Walk plenty  2. No lifting over 5 lbs  3. Use collar for comfort while ambulating.  4. Keep bandage on, clean and dry. Change to a new one if gets wet or dirty. Ok to shower from waist down. Make sure you have a bar to hold onto in the shower.  5. Pain meds: eRx sent to your pharmacy electronically, you need to pick it up  6. No driving on pain meds  7. See your surgeon in about 10 days in the office. Call to schedule.

## 2025-03-11 NOTE — DISCHARGE NOTE PROVIDER - HOSPITAL COURSE
The patient is a 45y Male status post C4-6 ACDF after failing outpatient nonoperative conservative management. Patient presented to [facility] ED and was medically cleared prior to procedure. The patient was taken to the operating room on 3/11/25. Prophylactic antibiotics were started before the procedure and continued for 24 hours. There were no complications during the procedure and patient tolerated the procedure well. The patient was transferred to the recovery room in stable condition and subsequently to the ICU for airway monitoring and MAP goals. Patient was then transferred to the surgical floor when stable. The patient had SCDs and was ambulatory while in hospital. Patient denied any home medications. The patient received physical therapy daily and daily labs were followed. The dressing was kept clean, dry, intact. The rest of the hospital stay was unremarkable. The patient is a 45y Male status post C4-6 ACDF after failing outpatient nonoperative conservative management. Patient presented to [facility] ED and was medically cleared prior to procedure. The patient was taken to the operating room on 3/11/25. Prophylactic antibiotics were started before the procedure and continued for 24 hours. There were no complications during the procedure and patient tolerated the procedure well. The patient was transferred to the recovery room in stable condition and subsequently to the ICU for airway monitoring and MAP goals. Patient was then transferred to the surgical floor when stable. The patient had SCDs and was ambulatory while in hospital. Drain removed on POD 1. Patient denied any home medications. The patient received physical therapy daily and daily labs were followed. The dressing was kept clean, dry, intact. The rest of the hospital stay was unremarkable.

## 2025-03-11 NOTE — PROGRESS NOTE ADULT - SUBJECTIVE AND OBJECTIVE BOX
Patient is a 45y old  Male who presents with a chief complaint of C4-6 ACDF (11 Mar 2025 14:41)      INTERVAL HPI/OVERNIGHT EVENTS: Patient seen and examined at bedside in ICU after OR. No overnight events occurred. Patient complains of post op pain. Denies fevers, chills, headache, lightheadedness, chest pain, dyspnea, abdominal pain, n/v/d/c.    MEDICATIONS  (STANDING):  acetaminophen     Tablet .. 975 milliGRAM(s) Oral every 8 hours  acetaminophen   IVPB .. 1000 milliGRAM(s) IV Intermittent once  ceFAZolin   IVPB 2000 milliGRAM(s) IV Intermittent every 8 hours  multivitamin 1 Tablet(s) Oral daily  pantoprazole    Tablet 40 milliGRAM(s) Oral before breakfast  polyethylene glycol 3350 17 Gram(s) Oral at bedtime  senna 2 Tablet(s) Oral at bedtime    MEDICATIONS  (PRN):  aluminum hydroxide/magnesium hydroxide/simethicone Suspension 30 milliLiter(s) Oral four times a day PRN Indigestion  cyclobenzaprine 5 milliGRAM(s) Oral three times a day PRN Muscle Spasm  magnesium hydroxide Suspension 30 milliLiter(s) Oral daily PRN Constipation  ondansetron Injectable 4 milliGRAM(s) IV Push every 6 hours PRN Nausea and/or Vomiting  oxyCODONE    IR 5 milliGRAM(s) Oral every 6 hours PRN Moderate Pain (4 - 6)  oxyCODONE    IR 10 milliGRAM(s) Oral every 6 hours PRN Severe Pain (7 - 10)  traMADol 50 milliGRAM(s) Oral every 6 hours PRN Mild Pain (1 - 3)      Allergies    No Known Allergies    Intolerances        REVIEW OF SYSTEMS:  CONSTITUTIONAL: No fever or chills  HEENT:  No headache, no sore throat  RESPIRATORY: No cough, wheezing, or shortness of breath  CARDIOVASCULAR: No chest pain, palpitations  GASTROINTESTINAL: No abd pain, nausea, vomiting, or diarrhea  GENITOURINARY: No dysuria, frequency, or hematuria  NEUROLOGICAL: no focal weakness or dizziness  MUSCULOSKELETAL: admits post op pain     Vital Signs Last 24 Hrs  T(C): 36.7 (11 Mar 2025 13:21), Max: 37.4 (11 Mar 2025 12:21)  T(F): 98.1 (11 Mar 2025 13:21), Max: 99.3 (11 Mar 2025 12:21)  HR: 104 (11 Mar 2025 15:00) (67 - 113)  BP: 122/90 (11 Mar 2025 15:00) (99/63 - 131/92)  BP(mean): 100 (11 Mar 2025 15:00) (100 - 103)  RR: 15 (11 Mar 2025 15:00) (13 - 18)  SpO2: 97% (11 Mar 2025 15:00) (95% - 99%)    Parameters below as of 11 Mar 2025 14:20  Patient On (Oxygen Delivery Method): room air        PHYSICAL EXAM:  GENERAL: NAD  HEENT:  anicteric, moist mucous membranes, brace in place   CHEST/LUNG:  CTA b/l, no rales, wheezes, or rhonchi  HEART:  RRR, S1, S2  ABDOMEN:  BS+, soft, nontender, nondistended  EXTREMITIES: no edema, cyanosis, or calf tenderness  NERVOUS SYSTEM: answers questions and follows commands appropriately    LABS:                        13.9   6.56  )-----------( 229      ( 11 Mar 2025 13:26 )             41.0     CBC Full  -  ( 11 Mar 2025 13:26 )  WBC Count : 6.56 K/uL  Hemoglobin : 13.9 g/dL  Hematocrit : 41.0 %  Platelet Count - Automated : 229 K/uL  Mean Cell Volume : 87.6 fl  Mean Cell Hemoglobin : 29.7 pg  Mean Cell Hemoglobin Concentration : 33.9 g/dL  Auto Neutrophil # : x  Auto Lymphocyte # : x  Auto Monocyte # : x  Auto Eosinophil # : x  Auto Basophil # : x  Auto Neutrophil % : x  Auto Lymphocyte % : x  Auto Monocyte % : x  Auto Eosinophil % : x  Auto Basophil % : x    11 Mar 2025 13:26    137    |  106    |  15     ----------------------------<  141    3.7     |  26     |  0.92     Ca    8.8        11 Mar 2025 13:26      PT/INR - ( 11 Mar 2025 04:47 )   PT: 11.9 sec;   INR: 1.02 ratio         PTT - ( 11 Mar 2025 04:47 )  PTT:31.7 sec  Urinalysis Basic - ( 11 Mar 2025 13:26 )    Color: x / Appearance: x / SG: x / pH: x  Gluc: 141 mg/dL / Ketone: x  / Bili: x / Urobili: x   Blood: x / Protein: x / Nitrite: x   Leuk Esterase: x / RBC: x / WBC x   Sq Epi: x / Non Sq Epi: x / Bacteria: x      CAPILLARY BLOOD GLUCOSE              RADIOLOGY & ADDITIONAL TESTS:    Personally reviewed.     Consultant(s) Notes Reviewed:  [x] YES  [ ] NO

## 2025-03-11 NOTE — DISCHARGE NOTE PROVIDER - NSDCCPCAREPLAN_GEN_ALL_CORE_FT
PRINCIPAL DISCHARGE DIAGNOSIS  Diagnosis: Radiculopathy, cervical  Assessment and Plan of Treatment:

## 2025-03-11 NOTE — PHARMACOTHERAPY INTERVENTION NOTE - COMMENTS
Completed medication reconciliation per discussion with patient (family at bedside) and pharmacy ([facility]).  Only takes ibuprofen 800mg intermittently for pain.

## 2025-03-11 NOTE — DISCHARGE NOTE PROVIDER - NSDCMRMEDTOKEN_GEN_ALL_CORE_FT
acetaZOLAMIDE 250 mg oral tablet: 2 tab(s) orally 2 times a day  dabigatran 150 mg oral capsule: 1 cap(s) orally 2 times a day  Medrol Dosepak 4 mg oral tablet: 1 packet(s) orally once   ocular lubricant ophthalmic solution: 1 drop(s) to each affected eye once a day  outpatient occupational therapy: outpatient occupational therapy    diagnosis: stroke  outpatient physical therapy: outpatient physical therapy    diagnosis: stroke  Tylenol 325 mg oral tablet: 2 tab(s) orally every 6 hours, As needed, Mild Pain (1 - 3)   Colace 100 mg oral capsule: 1 cap(s) orally 2 times a day as needed for  constipation  cyclobenzaprine 5 mg oral tablet: 1 tab(s) orally 3 times a day as needed for  muscle spasm  Rosendo SANCHEZ: To be worn while ambulating  naloxone 4 mg/0.1 mL nasal spray: 1 spray(s) intranasally once as needed for somnolence/suspected opioid overdose, may repeat every 3-5 minutes as needed if symptoms occur  ondansetron 4 mg oral tablet: 1 tab(s) orally 4 times a day as needed for  nausea  oxyCODONE 5 mg oral tablet: 1 tab(s) orally every 6 hours as needed for  severe pain MDD: 4

## 2025-03-11 NOTE — DISCHARGE NOTE PROVIDER - CARE PROVIDER_API CALL
Bebe Banerjee  Orthopaedic Surgery  [address] [facility], [address] [address]  Phone: [phone]  Fax: [phone]  Follow Up Time:

## 2025-03-11 NOTE — DISCHARGE NOTE PROVIDER - NSDCCPTREATMENT_GEN_ALL_CORE_FT
PRINCIPAL PROCEDURE  Procedure: Anterior cervical discectomy and fusion (ACDF)  Findings and Treatment: C4-6

## 2025-03-12 ENCOUNTER — TRANSCRIPTION ENCOUNTER (OUTPATIENT)
Age: 45
End: 2025-03-12

## 2025-03-12 VITALS
HEART RATE: 97 BPM | DIASTOLIC BLOOD PRESSURE: 91 MMHG | SYSTOLIC BLOOD PRESSURE: 143 MMHG | OXYGEN SATURATION: 97 % | RESPIRATION RATE: 15 BRPM

## 2025-03-12 LAB
ANION GAP SERPL CALC-SCNC: 6 MMOL/L — SIGNIFICANT CHANGE UP (ref 5–17)
APTT BLD: 29.3 SEC — SIGNIFICANT CHANGE UP (ref 24.5–35.6)
BUN SERPL-MCNC: 13 MG/DL — SIGNIFICANT CHANGE UP (ref 7–23)
CALCIUM SERPL-MCNC: 9.1 MG/DL — SIGNIFICANT CHANGE UP (ref 8.5–10.1)
CHLORIDE SERPL-SCNC: 106 MMOL/L — SIGNIFICANT CHANGE UP (ref 96–108)
CO2 SERPL-SCNC: 27 MMOL/L — SIGNIFICANT CHANGE UP (ref 22–31)
CREAT SERPL-MCNC: 0.93 MG/DL — SIGNIFICANT CHANGE UP (ref 0.5–1.3)
EGFR: 102 ML/MIN/1.73M2 — SIGNIFICANT CHANGE UP
EGFR: 102 ML/MIN/1.73M2 — SIGNIFICANT CHANGE UP
GLUCOSE SERPL-MCNC: 110 MG/DL — HIGH (ref 70–99)
HCT VFR BLD CALC: 39.4 % — SIGNIFICANT CHANGE UP (ref 39–50)
HGB BLD-MCNC: 13.1 G/DL — SIGNIFICANT CHANGE UP (ref 13–17)
INR BLD: 0.97 RATIO — SIGNIFICANT CHANGE UP (ref 0.85–1.16)
MAGNESIUM SERPL-MCNC: 2.2 MG/DL — SIGNIFICANT CHANGE UP (ref 1.6–2.6)
MCHC RBC-ENTMCNC: 29.4 PG — SIGNIFICANT CHANGE UP (ref 27–34)
MCHC RBC-ENTMCNC: 33.2 G/DL — SIGNIFICANT CHANGE UP (ref 32–36)
MCV RBC AUTO: 88.5 FL — SIGNIFICANT CHANGE UP (ref 80–100)
NRBC BLD AUTO-RTO: 0 /100 WBCS — SIGNIFICANT CHANGE UP (ref 0–0)
PHOSPHATE SERPL-MCNC: 4.1 MG/DL — SIGNIFICANT CHANGE UP (ref 2.5–4.5)
PLATELET # BLD AUTO: 226 K/UL — SIGNIFICANT CHANGE UP (ref 150–400)
POTASSIUM SERPL-MCNC: 3.8 MMOL/L — SIGNIFICANT CHANGE UP (ref 3.5–5.3)
POTASSIUM SERPL-SCNC: 3.8 MMOL/L — SIGNIFICANT CHANGE UP (ref 3.5–5.3)
PROTHROM AB SERPL-ACNC: 11.4 SEC — SIGNIFICANT CHANGE UP (ref 9.9–13.4)
RBC # BLD: 4.45 M/UL — SIGNIFICANT CHANGE UP (ref 4.2–5.8)
RBC # FLD: 12.2 % — SIGNIFICANT CHANGE UP (ref 10.3–14.5)
SODIUM SERPL-SCNC: 139 MMOL/L — SIGNIFICANT CHANGE UP (ref 135–145)
WBC # BLD: 10.48 K/UL — SIGNIFICANT CHANGE UP (ref 3.8–10.5)
WBC # FLD AUTO: 10.48 K/UL — SIGNIFICANT CHANGE UP (ref 3.8–10.5)

## 2025-03-12 PROCEDURE — 76000 FLUOROSCOPY <1 HR PHYS/QHP: CPT

## 2025-03-12 PROCEDURE — 80048 BASIC METABOLIC PNL TOTAL CA: CPT

## 2025-03-12 PROCEDURE — 83735 ASSAY OF MAGNESIUM: CPT

## 2025-03-12 PROCEDURE — 71045 X-RAY EXAM CHEST 1 VIEW: CPT

## 2025-03-12 PROCEDURE — 86850 RBC ANTIBODY SCREEN: CPT

## 2025-03-12 PROCEDURE — 86900 BLOOD TYPING SEROLOGIC ABO: CPT

## 2025-03-12 PROCEDURE — 72125 CT NECK SPINE W/O DYE: CPT | Mod: MC

## 2025-03-12 PROCEDURE — C9399: CPT

## 2025-03-12 PROCEDURE — 88304 TISSUE EXAM BY PATHOLOGIST: CPT

## 2025-03-12 PROCEDURE — 85610 PROTHROMBIN TIME: CPT

## 2025-03-12 PROCEDURE — C1889: CPT

## 2025-03-12 PROCEDURE — 84100 ASSAY OF PHOSPHORUS: CPT

## 2025-03-12 PROCEDURE — 99231 SBSQ HOSP IP/OBS SF/LOW 25: CPT | Mod: GC

## 2025-03-12 PROCEDURE — 85027 COMPLETE CBC AUTOMATED: CPT

## 2025-03-12 PROCEDURE — C1713: CPT

## 2025-03-12 PROCEDURE — 93005 ELECTROCARDIOGRAM TRACING: CPT

## 2025-03-12 PROCEDURE — 85730 THROMBOPLASTIN TIME PARTIAL: CPT

## 2025-03-12 PROCEDURE — 97161 PT EVAL LOW COMPLEX 20 MIN: CPT

## 2025-03-12 PROCEDURE — 99285 EMERGENCY DEPT VISIT HI MDM: CPT | Mod: 25

## 2025-03-12 PROCEDURE — 86901 BLOOD TYPING SEROLOGIC RH(D): CPT

## 2025-03-12 PROCEDURE — 97165 OT EVAL LOW COMPLEX 30 MIN: CPT

## 2025-03-12 PROCEDURE — 36415 COLL VENOUS BLD VENIPUNCTURE: CPT

## 2025-03-12 RX ORDER — ONDANSETRON HCL/PF 4 MG/2 ML
1 VIAL (ML) INJECTION
Qty: 20 | Refills: 0
Start: 2025-03-12 | End: 2025-03-16

## 2025-03-12 RX ORDER — DOCUSATE SODIUM 100 MG
1 CAPSULE ORAL
Qty: 10 | Refills: 0
Start: 2025-03-12 | End: 2025-03-16

## 2025-03-12 RX ORDER — NALOXONE HYDROCHLORIDE 0.4 MG/ML
1 INJECTION, SOLUTION INTRAMUSCULAR; INTRAVENOUS; SUBCUTANEOUS
Qty: 2 | Refills: 0
Start: 2025-03-12 | End: 2025-03-16

## 2025-03-12 RX ORDER — OXYCODONE HYDROCHLORIDE 30 MG/1
1 TABLET ORAL
Qty: 20 | Refills: 0
Start: 2025-03-12 | End: 2025-03-16

## 2025-03-12 RX ORDER — CYCLOBENZAPRINE HYDROCHLORIDE 15 MG/1
1 CAPSULE, EXTENDED RELEASE ORAL
Qty: 15 | Refills: 0
Start: 2025-03-12 | End: 2025-03-16

## 2025-03-12 RX ORDER — IBUPROFEN 200 MG
1 TABLET ORAL
Refills: 0 | DISCHARGE

## 2025-03-12 RX ORDER — NALOXONE HYDROCHLORIDE 0.4 MG/ML
1 INJECTION, SOLUTION INTRAMUSCULAR; INTRAVENOUS; SUBCUTANEOUS
Qty: 1 | Refills: 0
Start: 2025-03-12 | End: 2025-03-16

## 2025-03-12 RX ADMIN — Medication 0.5 MILLIGRAM(S): at 05:36

## 2025-03-12 RX ADMIN — Medication 975 MILLIGRAM(S): at 13:18

## 2025-03-12 RX ADMIN — Medication 40 MILLIGRAM(S): at 05:37

## 2025-03-12 RX ADMIN — Medication 0.5 MILLIGRAM(S): at 10:00

## 2025-03-12 RX ADMIN — Medication 975 MILLIGRAM(S): at 05:26

## 2025-03-12 RX ADMIN — Medication 0.5 MILLIGRAM(S): at 06:25

## 2025-03-12 RX ADMIN — Medication 975 MILLIGRAM(S): at 13:27

## 2025-03-12 RX ADMIN — Medication 975 MILLIGRAM(S): at 06:24

## 2025-03-12 RX ADMIN — Medication 0.5 MILLIGRAM(S): at 09:18

## 2025-03-12 RX ADMIN — Medication 100 MILLIGRAM(S): at 05:27

## 2025-03-12 RX ADMIN — Medication 1 TABLET(S): at 11:36

## 2025-03-12 NOTE — OCCUPATIONAL THERAPY INITIAL EVALUATION ADULT - MANUAL MUSCLE TESTING RESULTS, REHAB EVAL
spinal precautions/[place] J collar- BUEs 3+/5 throughout through functional use and gross assessment/grossly assessed due to

## 2025-03-12 NOTE — DISCHARGE NOTE NURSING/CASE MANAGEMENT/SOCIAL WORK - PATIENT PORTAL LINK FT
You can access the FollowMyHealth Patient Portal offered by [facility] by registering at the following website: [email or web address]. By joining [email or web address]’s FollowMyHealth portal, you will also be able to view your health information using other applications (apps) compatible with our system.

## 2025-03-12 NOTE — PROGRESS NOTE ADULT - SUBJECTIVE AND OBJECTIVE BOX
Pt seen and examined at bedside. No acute events overnight. Pain controlled, denies any numbness or tingling. Denies nausea, vomiting, chest pain, or shortness of breath. Denies any difficulty swallowing or breathing.        LABS:                        13.1   10.48 )-----------( 226      ( 12 Mar 2025 06:00 )             39.4     03-12    139  |  106  |  13  ----------------------------<  110[H]  3.8   |  27  |  0.93    Ca    9.1      12 Mar 2025 06:00  Phos  4.1     03-12  Mg     2.2     03-12      PT/INR - ( 12 Mar 2025 06:00 )   PT: 11.4 sec;   INR: 0.97 ratio         PTT - ( 12 Mar 2025 06:00 )  PTT:29.3 sec  Urinalysis Basic - ( 12 Mar 2025 06:00 )    Color: x / Appearance: x / SG: x / pH: x  Gluc: 110 mg/dL / Ketone: x  / Bili: x / Urobili: x   Blood: x / Protein: x / Nitrite: x   Leuk Esterase: x / RBC: x / WBC x   Sq Epi: x / Non Sq Epi: x / Bacteria: x        VITAL SIGNS:  T(C): 36.4 (03-12-25 @ 07:42), Max: 37.4 (03-11-25 @ 12:21)  HR: 79 (03-12-25 @ 08:00) (76 - 119)  BP: 126/90 (03-12-25 @ 08:00) (105/65 - 138/109)  RR: 13 (03-12-25 @ 08:00) (13 - 20)  SpO2: 96% (03-12-25 @ 08:00) (92% - 99%)        EXAM:  Gen: lying comfortably, in NAD  Dressing CDI  Motor:                   C5                C6              C7               C8           T1   R            5/5                5/5            5/5             5/5          5/5  L             5/5               5/5             5/5             5/5          5/5                L2             L3             L4               L5            S1  R         5/5           5/5          5/5             5/5           5/5  L          5/5          5/5           5/5             5/5           5/5    Sensory:            C5         C6         C7      C8       T1        (0=absent, 1=impaired, 2=normal, NT=not testable)  R         2            2           2        2         2  L          2            2           2        2         2               L2          L3         L4      L5       S1         (0=absent, 1=impaired, 2=normal, NT=not testable)  R         2            2            2        2        2  L          2            2           2        2         2    Negative Wylie, Babinski, and clonus bilaterally  Radial 2+ bilaterally  DP 2+ bilaterally           A/P:  45yMale sp C4-6 ACDF POD 1    - WBAT, [place] J  - PT/OT  - Pain control as needed  - DVT ppx: SCDs and ambulation  - JODEE drain present, please record drainage at the end of each shift in flowsheets  - Continuous ancef while JODEE in place  - Encourage incentive spirometry  - Dispo: pending PT recs and drain        Discussed with Dr. Banerjee and will update with any further recommendations

## 2025-03-12 NOTE — OCCUPATIONAL THERAPY INITIAL EVALUATION ADULT - GENERAL OBSERVATIONS, REHAB EVAL
Pt received OOB in bedside chair, in NAD, on RA, +[place] J collar, +tele, +pulse ox monitoring. Pt able to complete functional mobility and ADLs with supervision.  utilized (#[id number]). Pt left in semi-supine in bed, in NAD, on RA, all needs in reach, all lines intact, +bed alarm. RN aware of pt status. OT to continue POC as

## 2025-03-12 NOTE — PROGRESS NOTE ADULT - ASSESSMENT
45y M no significant PMH, presenting to ICU s/p ACDF for C4-C6 fusion, on 3/11 with Dr. Banerjee.    #Cervical radiculopathy  # S/p ACDF 3/11      Neuro  - Day 1 ACDF c4-c6  - Cervical radiculopathy improved s/p ACDF  - pain medications  - no complications  - c- collar in place, plan on d/c with it    Cardio  - Stable HR  - Stable BP  - Monitor hemodynamics    Pulm  - Not on oxygen  - Airway monitoring for 24 hours completed. Patient plan to be d/c    GI  - On full diet.     Renal  - Cr stable  - avoid nephrotoxic agents    ID  - abx per ortho discharge plan    Heme  - SCDs  - Removed JODEE drain    Ethics/ Goals  - Full code  - D/c today 3/12 in PM
45 year old male presents to [facility] ED for c/o neck pain beginning 10 days ago with worsening symptoms today including numbness and tingling of b/l UE.     Pre-Op Clearance:   - Reports having a "semi stroke" 3.5 years ago after hitting his head at work but was discharged the same day, most likely TIA.  - Denies any history of cardiac conditions including aortic stenosis, afib, CAD, MI, PPM.   - Denies any respiratory/lung disease.  - Denies any current CV symptoms including chest pain, claudication, dyspnea on exertion, orthopnea, and palpitations.   - METS: 9.89, RCRI 1   - Pt has no active ischemia, decompensated heart failure, unstable arrythmia, or severe stenotic valvular disease, and has  no modifiable risk factors. In the setting of intermediate risk ACDF, he is optimized from medical standpoint to proceed with planned procedure with routine hemodynamic monitoring.     dvt ppx: SCD's    will sign off at this time, please reconsult as needed

## 2025-03-12 NOTE — SOCIAL WORK PROGRESS NOTE - NSSWPROGRESSNOTE_GEN_ALL_CORE
Consult for post op dc plan marked as completed. PTE and OT not recommending services for pt at this time. Pt aware and in agreement. Pt shared that he received a cane and he added that his son will escort him home via private car when medically cleared.

## 2025-03-12 NOTE — PHYSICAL THERAPY INITIAL EVALUATION ADULT - ORIENTATION, REHAB EVAL
Pt speaks limited English but able to follow all commands appropriately./oriented to person, place, time and situation Pt speaks limited English but able to follow all commands appropriately.    [id number] used/oriented to person, place, time and situation

## 2025-03-12 NOTE — OCCUPATIONAL THERAPY INITIAL EVALUATION ADULT - BALANCE TRAINING, PT EVAL
Pt will perform grooming task standing at sink with modified independence for 2 minutes to improve functional activity standing tolerance in 3-5 OT sessions.

## 2025-03-12 NOTE — DISCHARGE NOTE NURSING/CASE MANAGEMENT/SOCIAL WORK - FINANCIAL ASSISTANCE
[facility] provides services at a reduced cost to those who are determined to be eligible through [facility]’s financial assistance program. Information regarding [facility]’s financial assistance program can be found by going to [email or web address] or by calling [phone].

## 2025-03-12 NOTE — PROGRESS NOTE ADULT - ATTENDING COMMENTS
46 yo man with Hx Cspine radiculopathy POD 1 s/p ACDF C4-6, pain controlled, no signs of airway compromise.    --pain inadequatley controlled, increase oxycodone for moderate pain and add dilaudid for severe pain, continue flexeril  --stable from hemodynamic standpoint  --no signs of airway compromise  --normal renal function  --tolerating PO diet  --no infectious concerns  --ppx with SCDs  --stable for d/c home per ortho

## 2025-03-12 NOTE — CASE MANAGEMENT PROGRESS NOTE - NSCMPROGRESSNOTE_GEN_ALL_CORE
Per MD, patient is medically cleared for discharge home today.  CM met with patient to discuss discharge disposition. Patient has no skilled needs at this time. Discharge notice reviewed, signed, and copy given to patient.  Patient verbalized understanding of the transition plan and is in agreement.  Confirmed pharmacy is  Pharmacy community MD is Dr. Malik.  Pt stated he would make his own follow up appointments. DMEs in home include cane. CM discussed plan with MD and RN. Patient stated his family will transport him home.  CM remains available throughout hospital stay.

## 2025-03-12 NOTE — PROGRESS NOTE ADULT - SUBJECTIVE AND OBJECTIVE BOX
Interval Events: No overnight events    Review of Systems:  Constitutional: No fever, chills, fatigue + Neck pain  Neuro: No headache, numbness, weakness  Resp: No cough, wheezing, shortness of breath  CVS: No chest pain, palpitations, leg swelling  GI: No abdominal pain, nausea, vomiting, diarrhea   : No dysuria, frequency, incontinence  Skin: No itching, burning, rashes, or lesions   Msk: No joint pain or swelling  Psych: No depression, anxiety, mood swings    ICU Vital Signs Last 24 Hrs  T(C): 36.4 (12 Mar 2025 11:55), Max: 36.8 (11 Mar 2025 20:01)  T(F): 97.6 (12 Mar 2025 11:55), Max: 98.3 (11 Mar 2025 20:01)  HR: 97 (12 Mar 2025 13:00) (76 - 119)  BP: 115/89 (12 Mar 2025 13:00) (115/81 - 138/109)  BP(mean): 99 (12 Mar 2025 13:00) (91 - 120)  ABP: --  ABP(mean): --  RR: 19 (12 Mar 2025 13:00) (12 - 20)  SpO2: 97% (12 Mar 2025 13:00) (92% - 98%)    O2 Parameters below as of 11 Mar 2025 19:00  Patient On (Oxygen Delivery Method): room air                CAPILLARY BLOOD GLUCOSE          I&O's Summary    11 Mar 2025 07:01  -  12 Mar 2025 07:00  --------------------------------------------------------  IN: 1300 mL / OUT: 2725 mL / NET: -1425 mL    12 Mar 2025 07:01  -  12 Mar 2025 13:45  --------------------------------------------------------  IN: 300 mL / OUT: 485 mL / NET: -185 mL        Physical Exam:     GENERAL: NAD, Sitting in chair  HEAD:  Atraumatic, neck collar adjustment present.   EYES: conjunctiva and sclera clear  ENT: Moist mucous membranes  HEART: Regular rate and rhythm, no murmurs, rubs, or gallops  LUNGS: Unlabored respirations.  Clear to auscultation bilaterally, no crackles, wheezing, or rhonchi  ABDOMEN: Soft, nontender, nondistended, +BS  EXTREMITIES: 2+ peripheral pulses bilaterally. No clubbing, cyanosis, or edema  NERVOUS SYSTEM:  A&Ox3, no focal deficits   SKIN: No rashes or lesions    Meds:          acetaminophen     Tablet .. Oral  cyclobenzaprine Oral PRN  HYDROmorphone  Injectable IV Push PRN  ondansetron Injectable IV Push PRN  oxyCODONE    IR Oral PRN  traMADol Oral PRN        aluminum hydroxide/magnesium hydroxide/simethicone Suspension Oral PRN  magnesium hydroxide Suspension Oral PRN  pantoprazole    Tablet Oral  polyethylene glycol 3350 Oral  senna Oral      multivitamin Oral  sodium chloride 0.9%. IV Continuous                                  13.1   10.48 )-----------( 226      ( 12 Mar 2025 06:00 )             39.4       03-12    139  |  106  |  13  ----------------------------<  110[H]  3.8   |  27  |  0.93    Ca    9.1      12 Mar 2025 06:00  Phos  4.1     03-12  Mg     2.2     03-12            PT/INR - ( 12 Mar 2025 06:00 )   PT: 11.4 sec;   INR: 0.97 ratio         PTT - ( 12 Mar 2025 06:00 )  PTT:29.3 sec  Urinalysis Basic - ( 12 Mar 2025 06:00 )    Color: x / Appearance: x / SG: x / pH: x  Gluc: 110 mg/dL / Ketone: x  / Bili: x / Urobili: x   Blood: x / Protein: x / Nitrite: x   Leuk Esterase: x / RBC: x / WBC x   Sq Epi: x / Non Sq Epi: x / Bacteria: x                  Radiology:    Bedside Ultrasound:    Tubes/Lines: n/a      GLOBAL ISSUE/BEST PRACTICE:  Analgesia: Y  Sedation: N  HOB elevation: Y  Stress ulcer prophylaxis: Y  VTE prophylaxis: N  Glycemic control: N  Nutrition:Y    CODE STATUS: Full code       Interval Events: No overnight events, pain controlled, no dysphagia or dyspnea      ICU Vital Signs Last 24 Hrs  T(C): 36.4 (12 Mar 2025 11:55), Max: 36.8 (11 Mar 2025 20:01)  T(F): 97.6 (12 Mar 2025 11:55), Max: 98.3 (11 Mar 2025 20:01)  HR: 97 (12 Mar 2025 13:00) (76 - 119)  BP: 115/89 (12 Mar 2025 13:00) (115/81 - 138/109)  BP(mean): 99 (12 Mar 2025 13:00) (91 - 120)  ABP: --  ABP(mean): --  RR: 19 (12 Mar 2025 13:00) (12 - 20)  SpO2: 97% (12 Mar 2025 13:00) (92% - 98%)    O2 Parameters below as of 11 Mar 2025 19:00  Patient On (Oxygen Delivery Method): room air                CAPILLARY BLOOD GLUCOSE          I&O's Summary    11 Mar 2025 07:01  -  12 Mar 2025 07:00  --------------------------------------------------------  IN: 1300 mL / OUT: 2725 mL / NET: -1425 mL    12 Mar 2025 07:01  -  12 Mar 2025 13:45  --------------------------------------------------------  IN: 300 mL / OUT: 485 mL / NET: -185 mL        Physical Exam:     GENERAL: NAD, Sitting in chair  HEAD:  Atraumatic, neck collar adjustment present.   EYES: conjunctiva and sclera clear  ENT: Moist mucous membranes  HEART: Regular rate and rhythm, no murmurs, rubs, or gallops  LUNGS: Unlabored respirations.  Clear to auscultation bilaterally, no crackles, wheezing, or rhonchi, no stridor  ABDOMEN: Soft, nontender, nondistended, +BS  EXTREMITIES: 2+ peripheral pulses bilaterally. No clubbing, cyanosis, or edema  NERVOUS SYSTEM:  A&Ox3    Meds:          acetaminophen     Tablet .. Oral  cyclobenzaprine Oral PRN  HYDROmorphone  Injectable IV Push PRN  ondansetron Injectable IV Push PRN  oxyCODONE    IR Oral PRN  traMADol Oral PRN        aluminum hydroxide/magnesium hydroxide/simethicone Suspension Oral PRN  magnesium hydroxide Suspension Oral PRN  pantoprazole    Tablet Oral  polyethylene glycol 3350 Oral  senna Oral      multivitamin Oral  sodium chloride 0.9%. IV Continuous                                  13.1   10.48 )-----------( 226      ( 12 Mar 2025 06:00 )             39.4       03-12    139  |  106  |  13  ----------------------------<  110[H]  3.8   |  27  |  0.93    Ca    9.1      12 Mar 2025 06:00  Phos  4.1     03-12  Mg     2.2     03-12            PT/INR - ( 12 Mar 2025 06:00 )   PT: 11.4 sec;   INR: 0.97 ratio         PTT - ( 12 Mar 2025 06:00 )  PTT:29.3 sec  Urinalysis Basic - ( 12 Mar 2025 06:00 )    Color: x / Appearance: x / SG: x / pH: x  Gluc: 110 mg/dL / Ketone: x  / Bili: x / Urobili: x   Blood: x / Protein: x / Nitrite: x   Leuk Esterase: x / RBC: x / WBC x   Sq Epi: x / Non Sq Epi: x / Bacteria: x    DATA REVIEW  All data personally reviewed.  See above for details    Laboratory and Micro results--normal WBC, stable Hx, normal Cr and lytes  Radiology results--  Cardiology results--    Tubes/Lines: n/a      GLOBAL ISSUE/BEST PRACTICE:  Analgesia: Y  Sedation: N  HOB elevation: Y  Stress ulcer prophylaxis: Y  VTE prophylaxis: N  Glycemic control: N  Nutrition:Y    CODE STATUS: Full code

## 2025-03-12 NOTE — PHYSICAL THERAPY INITIAL EVALUATION ADULT - GENERAL OBSERVATIONS, REHAB EVAL
Pt received OOB in bedside chair, in NAD, on RA, +[place] collar, +tele, +pulse ox monitoring. Pt able to complete functional mobility and ADLs with supervision.  utilized (#240017). Pt left in semi-supine in bed, in NAD, on RA, all needs in reach, all lines intact, +bed alarm. RN aware of pt status. OT to continue POC as

## 2025-03-12 NOTE — OCCUPATIONAL THERAPY INITIAL EVALUATION ADULT - RANGE OF MOTION EXAMINATION, UPPER EXTREMITY
within spinal precautions, shoulders to 90 degrees/bilateral UE Active ROM was WNL (within normal limits)

## 2025-03-12 NOTE — DISCHARGE NOTE NURSING/CASE MANAGEMENT/SOCIAL WORK - NSDCPEFALRISK_GEN_ALL_CORE
For information on Fall & Injury Prevention, visit: [email or web address] OR  [email or web address] OR  https://www.cdc.gov/steadi/patient.html

## 2025-03-12 NOTE — OCCUPATIONAL THERAPY INITIAL EVALUATION ADULT - PRECAUTIONS/LIMITATIONS, REHAB EVAL
Rosendo SANCHEZ collar (as per ortho resident, collar to be worn AAT)/fall precautions/spinal precautions

## 2025-03-17 NOTE — ED PROVIDER NOTE - INTERNATIONAL TRAVEL
DX: Multifocal strokes primarily L ICA territory suggesting symptomatic L ICA stenosis  Imaging  CTA head 3/10: Worsening severe stenosis in the proximal left ICA, no significant intracranial stenosis or LVO  CT head 3/10: No LVO   MRI brain 3/10: Multiple small scattered acute infarcts in the left cerebral hemisphere  CTA H/N 3/13 scattered left hemispheric infarctions  MRI brain 3/13: Multiple small foci of acute/recent ischemia in the left MCA territory   VAS carotid: Right< 50% stenosis, left > 70 stenosis of ICA  Developed worsening neurological exam on 3/13 due to hypoperfusion and cerebral edema  Transferred to RA ICU for vasopressor support to target MAP > 100  S/p ASA/Plavix load, Heparin gtt ACS low initiated    Plan:  Continue statin and Plavix 75mg   Continue heparin ACS low gtt   Holding ASA, resume when okay with vascular surgery  Neuro checks- q4hrs  Neurology and vascular surgery following, appreciate recommendations for anticoagulation post CEA   PT/OT/Speech eval   neosynephrine for goal MAP > 100 mmHg in the setting of neuro deficits   No

## 2025-07-31 NOTE — PATIENT PROFILE ADULT - NSPRESCRALCFREQ_GEN_A_NUR
NOV 8/8/25  LOV 2/7/25  Lab Results   Component Value Date/Time     06/04/2025 02:09 PM    K 4.3 06/04/2025 02:09 PM    K 4.1 12/12/2023 07:55 AM     06/04/2025 02:09 PM    CO2 24 06/04/2025 02:09 PM    BUN 17 06/04/2025 02:09 PM    CREATININE 1.2 06/04/2025 02:09 PM    GLUCOSE 86 06/04/2025 02:09 PM    CALCIUM 10.0 06/04/2025 02:09 PM    LABGLOM 49 06/04/2025 02:09 PM    LABGLOM >60 02/16/2024 09:14 AM         Never

## (undated) DEVICE — SOL INJ NS 0.9% 500ML 2 PORT

## (undated) DEVICE — DRAPE SURGICAL #1010

## (undated) DEVICE — DRSG DERMABOND 0.7ML

## (undated) DEVICE — Device

## (undated) DEVICE — PACK MINOR WITH LAP

## (undated) DEVICE — PLV-SCD MACHINE: Type: DURABLE MEDICAL EQUIPMENT

## (undated) DEVICE — SUT MONOCRYL 3-0 18" PS-2 UNDYED

## (undated) DEVICE — CERVICAL COLLAR DJ ORTHOS FORM FIT MED DENSITY XL 4.5X24"

## (undated) DEVICE — DRAPE TOWEL BLUE 17" X 24"

## (undated) DEVICE — VENODYNE/SCD SLEEVE CALF LARGE

## (undated) DEVICE — DRSG STERISTRIPS 0.5 X 4"

## (undated) DEVICE — SPONGE PEANUT AUTO COUNT

## (undated) DEVICE — GLV 8 PROTEXIS (BLUE)

## (undated) DEVICE — DRAPE C ARM UNIVERSAL

## (undated) DEVICE — PREP DURAPREP 26CC

## (undated) DEVICE — DRSG TAPE MICROPORE 3"

## (undated) DEVICE — PREP ALCOHOL PAD

## (undated) DEVICE — DRAPE MAYO STAND 23"

## (undated) DEVICE — TUBING CODMAN INTEGRATED BIPOLAR CORD & TUBING SET FLYING LEADS

## (undated) DEVICE — DRAIN RESERVOIR FOR JACKSON PRATT 100CC CARDINAL

## (undated) DEVICE — MIDAS REX LEGEND MATCH HEAD FLUTED LG BORE 3.0MM X 14CM

## (undated) DEVICE — DRSG TEGADERM 2.5 X 3"

## (undated) DEVICE — MIDAS REX LEGEND LUBRICANT DIFFUSER CARTRIDGE

## (undated) DEVICE — DRSG CURITY GAUZE SPONGE 4 X 4" 12-PLY

## (undated) DEVICE — SUT SOFSILK 2-0 18" C-15

## (undated) DEVICE — DRAIN JACKSON PRATT 7MM FLAT FULL NO TROCAR

## (undated) DEVICE — FRAZIER SUCTION TIP 8FR

## (undated) DEVICE — DRAPE 3/4 SHEET W REINFORCEMENT 56X77"

## (undated) DEVICE — DRAPE IOBAN 13" X 13"

## (undated) DEVICE — ELCTR BOVIE TIP BLADE INSULATED 4" EDGE

## (undated) DEVICE — GLV 8 PROTEXIS ORTHO (CREAM)

## (undated) DEVICE — DRAPE MICROSCOPE OPMI VISIONGUARD 48X118"

## (undated) DEVICE — SUT POLYSORB 2-0 30" C-14 UNDYED

## (undated) DEVICE — VENODYNE/SCD SLEEVE CALF MEDIUM

## (undated) DEVICE — ELCTR BOVIE TIP BLADE INSULATED 2.75" EDGE

## (undated) DEVICE — DRAPE INSTRUMENT POUCH 7" X 12"

## (undated) DEVICE — WARMING BLANKET LOWER ADULT

## (undated) DEVICE — SUT POLYSORB 3-0 18" P-12 UNDYED

## (undated) DEVICE — NDL SPINAL 18G X 3.5" (PINK)

## (undated) DEVICE — MARKING PEN W RULER

## (undated) DEVICE — POSITIONER FOAM LAMINECTOMY ARM CRADLE (PINK)

## (undated) DEVICE — GOWN XL W TOWEL

## (undated) DEVICE — PLV/PSP-ESU FORCEFX F8I7418A: Type: DURABLE MEDICAL EQUIPMENT

## (undated) DEVICE — MIDAS REX LEGEND MATCH HEAD FLUTED SM BORE 3.0MM X 10CM

## (undated) DEVICE — BLADE SCALPEL SAFETYLOCK #15

## (undated) DEVICE — DRSG DERMABOND MINI